# Patient Record
Sex: FEMALE | Race: WHITE | Employment: FULL TIME | URBAN - METROPOLITAN AREA
[De-identification: names, ages, dates, MRNs, and addresses within clinical notes are randomized per-mention and may not be internally consistent; named-entity substitution may affect disease eponyms.]

---

## 2017-08-04 ENCOUNTER — OFFICE VISIT (OUTPATIENT)
Dept: FAMILY MEDICINE CLINIC | Age: 37
End: 2017-08-04

## 2017-08-04 VITALS
BODY MASS INDEX: 41.94 KG/M2 | HEART RATE: 87 BPM | SYSTOLIC BLOOD PRESSURE: 120 MMHG | DIASTOLIC BLOOD PRESSURE: 70 MMHG | RESPIRATION RATE: 16 BRPM | WEIGHT: 284 LBS | OXYGEN SATURATION: 99 %

## 2017-08-04 DIAGNOSIS — N89.8 CYST, VAGINA: Primary | ICD-10-CM

## 2017-08-04 PROCEDURE — 99213 OFFICE O/P EST LOW 20 MIN: CPT | Performed by: NURSE PRACTITIONER

## 2017-08-04 RX ORDER — SULFAMETHOXAZOLE AND TRIMETHOPRIM 800; 160 MG/1; MG/1
2 TABLET ORAL 2 TIMES DAILY
Qty: 40 TABLET | Refills: 0 | Status: SHIPPED | OUTPATIENT
Start: 2017-08-04 | End: 2017-08-14

## 2018-01-09 ENCOUNTER — TELEPHONE (OUTPATIENT)
Dept: FAMILY MEDICINE CLINIC | Age: 38
End: 2018-01-09

## 2018-01-09 DIAGNOSIS — E11.9 TYPE 2 DIABETES MELLITUS WITHOUT COMPLICATION, WITHOUT LONG-TERM CURRENT USE OF INSULIN (HCC): Primary | ICD-10-CM

## 2018-01-09 NOTE — TELEPHONE ENCOUNTER
Attempted to contact patient on 1/9/2018. Result: left message on the patient's voicemail asking patient to return my call. Pre-Visit planning not completed.

## 2018-01-11 RX ORDER — DROSPIRENONE AND ETHINYL ESTRADIOL 0.02-3(28)
1 KIT ORAL DAILY
COMMUNITY
End: 2018-11-12

## 2018-01-11 NOTE — TELEPHONE ENCOUNTER
Attempted to contact patient on 1/11/2018. Result: left message on the patient's voicemail asking patient to return my call. Pre-Visit planning not completed.

## 2018-01-11 NOTE — TELEPHONE ENCOUNTER
Dr. Toni Coronel: This patient has an upcoming appointment with you for Diabetes. In planning for that visit I have completed the following pre-visit planning:     Pre-Visit Planning Checklist:  Patient contacted: yes  Verified patient by name and date of birth: yes    Health Maintenance items reviewed:    Pneumonia Vaccination:  patient not up to date; declined health this health maintenance recommendation     Labs and procedures pended: Fasting Hemoglobin A1C , Urine Microalbumin , CMP, Fasting Lipid  Labs and procedures discussed with patient: yes  Reminded patient to check with their insurance company about coverage for lab tests and lab location: no    Preliminary Medication Reconciliation: was performed. Reminded patient to bring medications to appointment: no    Reminded patient to arrive early: yes    Other notes: Patient is not able to have labs done prior to her appointment, she verbalized understanding of fasting labs & plans to have them done at the office visit tomorrow. Please complete the med-reconciliation and sign the appropriate labs as soon as possible.       Zeny Burton MA  Pre-Services Specialist

## 2018-01-12 ENCOUNTER — OFFICE VISIT (OUTPATIENT)
Dept: FAMILY MEDICINE CLINIC | Age: 38
End: 2018-01-12

## 2018-01-12 VITALS
BODY MASS INDEX: 41.65 KG/M2 | OXYGEN SATURATION: 98 % | DIASTOLIC BLOOD PRESSURE: 98 MMHG | SYSTOLIC BLOOD PRESSURE: 144 MMHG | TEMPERATURE: 97.6 F | HEART RATE: 111 BPM | HEIGHT: 69 IN | WEIGHT: 281.2 LBS

## 2018-01-12 DIAGNOSIS — M77.8 TENDINITIS OF RIGHT SHOULDER: ICD-10-CM

## 2018-01-12 DIAGNOSIS — E11.9 TYPE 2 DIABETES MELLITUS WITHOUT COMPLICATION, WITHOUT LONG-TERM CURRENT USE OF INSULIN (HCC): Primary | ICD-10-CM

## 2018-01-12 DIAGNOSIS — Z23 NEED FOR INFLUENZA VACCINATION: ICD-10-CM

## 2018-01-12 LAB
A/G RATIO: 1.3 (ref 1.1–2.2)
ALBUMIN SERPL-MCNC: 3.8 G/DL (ref 3.4–5)
ALP BLD-CCNC: 70 U/L (ref 40–129)
ALT SERPL-CCNC: 45 U/L (ref 10–40)
ANION GAP SERPL CALCULATED.3IONS-SCNC: 19 MMOL/L (ref 3–16)
AST SERPL-CCNC: 26 U/L (ref 15–37)
BILIRUB SERPL-MCNC: 0.3 MG/DL (ref 0–1)
BUN BLDV-MCNC: 13 MG/DL (ref 7–20)
CALCIUM SERPL-MCNC: 9 MG/DL (ref 8.3–10.6)
CHLORIDE BLD-SCNC: 99 MMOL/L (ref 99–110)
CHOLESTEROL, TOTAL: 168 MG/DL (ref 0–199)
CO2: 23 MMOL/L (ref 21–32)
CREAT SERPL-MCNC: <0.5 MG/DL (ref 0.6–1.1)
GFR AFRICAN AMERICAN: >60
GFR NON-AFRICAN AMERICAN: >60
GLOBULIN: 2.9 G/DL
GLUCOSE BLD-MCNC: 234 MG/DL (ref 70–99)
HDLC SERPL-MCNC: 44 MG/DL (ref 40–60)
LDL CHOLESTEROL CALCULATED: 82 MG/DL
POTASSIUM SERPL-SCNC: 4.2 MMOL/L (ref 3.5–5.1)
SODIUM BLD-SCNC: 141 MMOL/L (ref 136–145)
TOTAL PROTEIN: 6.7 G/DL (ref 6.4–8.2)
TRIGL SERPL-MCNC: 211 MG/DL (ref 0–150)
VLDLC SERPL CALC-MCNC: 42 MG/DL

## 2018-01-12 PROCEDURE — 90630 INFLUENZA, QUADV, 18-64 YRS, ID, PF, MICRO INJ, 0.1ML (FLUZONE QUADV, PF): CPT | Performed by: FAMILY MEDICINE

## 2018-01-12 PROCEDURE — 90471 IMMUNIZATION ADMIN: CPT | Performed by: FAMILY MEDICINE

## 2018-01-12 PROCEDURE — 36415 COLL VENOUS BLD VENIPUNCTURE: CPT | Performed by: FAMILY MEDICINE

## 2018-01-12 PROCEDURE — 99214 OFFICE O/P EST MOD 30 MIN: CPT | Performed by: FAMILY MEDICINE

## 2018-01-12 RX ORDER — MELOXICAM 15 MG/1
15 TABLET ORAL DAILY
Qty: 30 TABLET | Refills: 3 | Status: ON HOLD | OUTPATIENT
Start: 2018-01-12 | End: 2018-12-19 | Stop reason: HOSPADM

## 2018-01-13 LAB
ESTIMATED AVERAGE GLUCOSE: 231.7 MG/DL
HBA1C MFR BLD: 9.7 %

## 2018-01-21 NOTE — PROGRESS NOTES
Subjective:      Patient ID: Soha Artis is a 40 y.o. female. Soha Artis is a 40 y.o. female. Patient presents with:  Shoulder Pain: left   Diabetes        The patients PMH, surgical history, family history, medications, allergies were all reviewed and updated as appropriate today. Shoulder Pain    The pain is present in the right shoulder. This is a new problem. The current episode started 1 to 4 weeks ago. The problem occurs daily. The problem has been gradually worsening. The quality of the pain is described as aching. The pain is moderate. Pertinent negatives include no fever, inability to bear weight or itching. The symptoms are aggravated by activity. The treatment provided significant relief. Her past medical history is significant for diabetes. Diabetes   She presents for her follow-up diabetic visit. She has type 2 diabetes mellitus. Her disease course has been stable. There are no hypoglycemic associated symptoms. There are no diabetic associated symptoms. There are no hypoglycemic complications. There are no diabetic complications. There are no known risk factors for coronary artery disease. When asked about current treatments, none were reported. She is compliant with treatment all of the time. Her weight is stable. She is following a diabetic diet. When asked about meal planning, she reported none. She rarely participates in exercise. Her home blood glucose trend is fluctuating dramatically. An ACE inhibitor/angiotensin II receptor blocker is being taken. She does not see a podiatrist.Eye exam is current. Review of Systems   Constitutional: Negative for fever. Skin: Negative for itching. Objective:   Physical Exam   Constitutional: She is oriented to person, place, and time. She appears well-developed and well-nourished. HENT:   Head: Normocephalic and atraumatic.    Right Ear: External ear normal.   Left Ear: External ear normal.   Nose: Nose normal.

## 2018-09-04 ENCOUNTER — TELEPHONE (OUTPATIENT)
Dept: FAMILY MEDICINE CLINIC | Age: 38
End: 2018-09-04

## 2018-09-06 ENCOUNTER — TELEPHONE (OUTPATIENT)
Dept: FAMILY MEDICINE CLINIC | Age: 38
End: 2018-09-06

## 2018-09-10 ENCOUNTER — OFFICE VISIT (OUTPATIENT)
Dept: FAMILY MEDICINE CLINIC | Age: 38
End: 2018-09-10

## 2018-09-10 VITALS
SYSTOLIC BLOOD PRESSURE: 130 MMHG | OXYGEN SATURATION: 98 % | DIASTOLIC BLOOD PRESSURE: 80 MMHG | WEIGHT: 271 LBS | BODY MASS INDEX: 40.02 KG/M2 | HEART RATE: 104 BPM

## 2018-09-10 DIAGNOSIS — R56.9 NEW ONSET SEIZURE (HCC): ICD-10-CM

## 2018-09-10 DIAGNOSIS — E11.9 TYPE 2 DIABETES MELLITUS WITHOUT COMPLICATION, WITHOUT LONG-TERM CURRENT USE OF INSULIN (HCC): Primary | ICD-10-CM

## 2018-09-10 PROCEDURE — 99214 OFFICE O/P EST MOD 30 MIN: CPT | Performed by: FAMILY MEDICINE

## 2018-09-10 RX ORDER — LANCETS 33 GAUGE
EACH MISCELLANEOUS
COMMUNITY
Start: 2018-09-03 | End: 2018-09-10 | Stop reason: SDUPTHER

## 2018-09-10 RX ORDER — BLOOD-GLUCOSE METER
KIT MISCELLANEOUS
COMMUNITY
Start: 2018-09-03

## 2018-09-10 RX ORDER — LEVETIRACETAM 500 MG/1
500 TABLET ORAL 2 TIMES DAILY
COMMUNITY
Start: 2018-09-05 | End: 2019-11-13

## 2018-09-10 RX ORDER — LANCETS 33 GAUGE
EACH MISCELLANEOUS
Qty: 100 EACH | Refills: 1 | Status: SHIPPED | OUTPATIENT
Start: 2018-09-10

## 2018-09-10 ASSESSMENT — PATIENT HEALTH QUESTIONNAIRE - PHQ9
SUM OF ALL RESPONSES TO PHQ QUESTIONS 1-9: 0
SUM OF ALL RESPONSES TO PHQ QUESTIONS 1-9: 0
1. LITTLE INTEREST OR PLEASURE IN DOING THINGS: 0
2. FEELING DOWN, DEPRESSED OR HOPELESS: 0
SUM OF ALL RESPONSES TO PHQ9 QUESTIONS 1 & 2: 0

## 2018-09-12 ENCOUNTER — TELEPHONE (OUTPATIENT)
Dept: FAMILY MEDICINE CLINIC | Age: 38
End: 2018-09-12

## 2018-09-16 PROBLEM — R56.9 NEW ONSET SEIZURE (HCC): Status: ACTIVE | Noted: 2018-09-16

## 2018-09-16 NOTE — PROGRESS NOTES
9/10/2018     Miguel A Pepe (:  1980) is a 41 y.o. female, here for evaluation of the following medical concerns:    Carter Fragoso is a 41 y.o. female. Patient presents with: Follow-Up from Hospital: [facility] - Seizure       42 yo female with diabetes who recently was admitted to hospital for new onset seizures. Was not compliant to her diabetes medications and her A1C showed that her diabetes was not controlled, but she was not in DKA or hyperosmolar. Patient was started on Keppra and discharged with Neurology f/u. Patient has had no new seizure activity since. Denies any issues with neurologic deficits at this time. Patient notes no weakness or sensonry changes. No change in mental status. The patients PMH, surgical history, family history, medications, allergies were all reviewed and updated as appropriate today. Review of Systems    Prior to Visit Medications    Medication Sig Taking?  Authorizing Provider   levETIRAcetam (KEPPRA) 500 MG tablet Take 500 mg by mouth Yes Historical Provider, MD   Blood Glucose Monitoring Suppl (ONE TOUCH ULTRA MINI) w/Device KIT  Yes Historical Provider, MD Webber Flako LANCETS 55S MISC Use to test daily Yes Mendy Jones MD   ONE TOUCH ULTRA TEST strip Use to test daily Yes Mendy Jones MD   metFORMIN (GLUCOPHAGE) 1000 MG tablet TAKE 1 TABLET BY MOUTH TWICE DAILY WITH MEALS Yes Mendy Jones MD   meloxicam (MOBIC) 15 MG tablet Take 1 tablet by mouth daily Yes Mendy Jones MD   drospirenone-ethinyl estradiol (DIEGO) 3-0.02 MG per tablet Take 1 tablet by mouth daily Yes Historical Provider, MD   Liraglutide (VICTOZA) 18 MG/3ML SOPN SC injection 1.8  daily  RADHA Morales - INES        Social History   Substance Use Topics    Smoking status: Never Smoker    Smokeless tobacco: Never Used    Alcohol use No        Vitals:    09/10/18 1418 09/10/18 1439   BP: (!) 150/80 130/80   Pulse: 104    SpO2: 98%    Weight: 271 lb (122.9

## 2018-10-29 ENCOUNTER — TELEPHONE (OUTPATIENT)
Dept: FAMILY MEDICINE CLINIC | Age: 38
End: 2018-10-29

## 2018-11-08 ENCOUNTER — TELEPHONE (OUTPATIENT)
Dept: FAMILY MEDICINE CLINIC | Age: 38
End: 2018-11-08

## 2018-11-08 DIAGNOSIS — E11.9 TYPE 2 DIABETES MELLITUS WITHOUT COMPLICATION, WITHOUT LONG-TERM CURRENT USE OF INSULIN (HCC): Primary | ICD-10-CM

## 2018-11-09 RX ORDER — RIVAROXABAN 20 MG/1
TABLET, FILM COATED ORAL
Refills: 3 | COMMUNITY
Start: 2018-10-28 | End: 2019-04-24 | Stop reason: ALTCHOICE

## 2018-11-09 NOTE — TELEPHONE ENCOUNTER
Dr. Fer Parekh:    Notes: This patient has an upcoming appointment with you for Diabetes. In planning for that visit I have completed the following pre-visit planning:     Pre-Visit Planning Checklist:  Patient contacted: yes  Verified patient by name and date of birth: yes    Health Maintenance items reviewed:    No pre-visit planning health maintenance topics to review at this time    Labs and procedures pended: Non-Fasting Hemoglobin A1C  and Urine Microalbumin   Labs and procedures discussed with patient: yes  Reminded patient to check with their insurance company about coverage for lab tests and lab location: no    Preliminary Medication Reconciliation: was performed. Reminded patient to arrive early: yes    Please complete the med-reconciliation and sign the appropriate labs as soon as possible.       Juanita Bowman MA  Pre-Services Specialist

## 2018-11-12 ENCOUNTER — OFFICE VISIT (OUTPATIENT)
Dept: FAMILY MEDICINE CLINIC | Age: 38
End: 2018-11-12
Payer: COMMERCIAL

## 2018-11-12 VITALS
HEART RATE: 97 BPM | BODY MASS INDEX: 41.35 KG/M2 | DIASTOLIC BLOOD PRESSURE: 96 MMHG | WEIGHT: 280 LBS | OXYGEN SATURATION: 97 % | SYSTOLIC BLOOD PRESSURE: 132 MMHG

## 2018-11-12 DIAGNOSIS — N92.6 MENSTRUAL IRREGULARITY: Primary | ICD-10-CM

## 2018-11-12 DIAGNOSIS — E11.9 TYPE 2 DIABETES MELLITUS WITHOUT COMPLICATION, WITHOUT LONG-TERM CURRENT USE OF INSULIN (HCC): ICD-10-CM

## 2018-11-12 LAB
A/G RATIO: 1.3 (ref 1.1–2.2)
ALBUMIN SERPL-MCNC: 4 G/DL (ref 3.4–5)
ALP BLD-CCNC: 89 U/L (ref 40–129)
ALT SERPL-CCNC: 23 U/L (ref 10–40)
ANION GAP SERPL CALCULATED.3IONS-SCNC: 17 MMOL/L (ref 3–16)
AST SERPL-CCNC: 14 U/L (ref 15–37)
BILIRUB SERPL-MCNC: <0.2 MG/DL (ref 0–1)
BUN BLDV-MCNC: 14 MG/DL (ref 7–20)
CALCIUM SERPL-MCNC: 9.1 MG/DL (ref 8.3–10.6)
CHLORIDE BLD-SCNC: 103 MMOL/L (ref 99–110)
CHOLESTEROL, TOTAL: 150 MG/DL (ref 0–199)
CO2: 20 MMOL/L (ref 21–32)
CREAT SERPL-MCNC: 0.6 MG/DL (ref 0.6–1.1)
CREATININE URINE: 248 MG/DL (ref 28–259)
GFR AFRICAN AMERICAN: >60
GFR NON-AFRICAN AMERICAN: >60
GLOBULIN: 3 G/DL
GLUCOSE BLD-MCNC: 257 MG/DL (ref 70–99)
HBA1C MFR BLD: 9 %
HDLC SERPL-MCNC: 48 MG/DL (ref 40–60)
LDL CHOLESTEROL CALCULATED: 73 MG/DL
MICROALBUMIN UR-MCNC: 1.5 MG/DL
MICROALBUMIN/CREAT UR-RTO: 6 MG/G (ref 0–30)
POTASSIUM SERPL-SCNC: 4.2 MMOL/L (ref 3.5–5.1)
SODIUM BLD-SCNC: 140 MMOL/L (ref 136–145)
TOTAL PROTEIN: 7 G/DL (ref 6.4–8.2)
TRIGL SERPL-MCNC: 147 MG/DL (ref 0–150)
TSH REFLEX: 3.57 UIU/ML (ref 0.27–4.2)
VLDLC SERPL CALC-MCNC: 29 MG/DL

## 2018-11-12 PROCEDURE — 99214 OFFICE O/P EST MOD 30 MIN: CPT | Performed by: FAMILY MEDICINE

## 2018-11-12 PROCEDURE — 83036 HEMOGLOBIN GLYCOSYLATED A1C: CPT | Performed by: FAMILY MEDICINE

## 2018-11-12 RX ORDER — SPIRONOLACTONE 25 MG/1
25 TABLET ORAL DAILY
Qty: 30 TABLET | Refills: 1 | Status: SHIPPED | OUTPATIENT
Start: 2018-11-12 | End: 2019-01-16 | Stop reason: SDUPTHER

## 2018-11-16 ENCOUNTER — OFFICE VISIT (OUTPATIENT)
Dept: OBGYN CLINIC | Age: 38
End: 2018-11-16
Payer: COMMERCIAL

## 2018-11-16 VITALS
WEIGHT: 276.2 LBS | SYSTOLIC BLOOD PRESSURE: 126 MMHG | TEMPERATURE: 97.8 F | HEIGHT: 68 IN | BODY MASS INDEX: 41.86 KG/M2 | DIASTOLIC BLOOD PRESSURE: 78 MMHG | HEART RATE: 97 BPM

## 2018-11-16 DIAGNOSIS — D68.51 HETEROZYGOUS FACTOR V LEIDEN MUTATION (HCC): ICD-10-CM

## 2018-11-16 DIAGNOSIS — Z86.718 HX OF CEREBRAL VENOUS SINUS THROMBOSIS: Primary | ICD-10-CM

## 2018-11-16 DIAGNOSIS — N93.9 ABNORMAL UTERINE BLEEDING (AUB): Primary | ICD-10-CM

## 2018-11-16 DIAGNOSIS — N93.9 ABNORMAL UTERINE BLEEDING: ICD-10-CM

## 2018-11-16 DIAGNOSIS — E66.01 CLASS 3 SEVERE OBESITY DUE TO EXCESS CALORIES WITH BODY MASS INDEX (BMI) OF 40.0 TO 44.9 IN ADULT, UNSPECIFIED WHETHER SERIOUS COMORBIDITY PRESENT (HCC): ICD-10-CM

## 2018-11-16 DIAGNOSIS — Z30.013 ENCOUNTER FOR INITIAL PRESCRIPTION OF INJECTABLE CONTRACEPTIVE: ICD-10-CM

## 2018-11-16 DIAGNOSIS — E11.65 TYPE 2 DIABETES MELLITUS WITH HYPERGLYCEMIA, WITHOUT LONG-TERM CURRENT USE OF INSULIN (HCC): ICD-10-CM

## 2018-11-16 DIAGNOSIS — L68.0 FEMALE HIRSUTISM: ICD-10-CM

## 2018-11-16 PROBLEM — E66.813 CLASS 3 SEVERE OBESITY DUE TO EXCESS CALORIES WITH BODY MASS INDEX (BMI) OF 40.0 TO 44.9 IN ADULT: Status: ACTIVE | Noted: 2018-11-16

## 2018-11-16 LAB
CONTROL: NORMAL
PREGNANCY TEST URINE, POC: NEGATIVE

## 2018-11-16 PROCEDURE — 81025 URINE PREGNANCY TEST: CPT | Performed by: OBSTETRICS & GYNECOLOGY

## 2018-11-16 PROCEDURE — 76856 US EXAM PELVIC COMPLETE: CPT | Performed by: OBSTETRICS & GYNECOLOGY

## 2018-11-16 PROCEDURE — 99204 OFFICE O/P NEW MOD 45 MIN: CPT | Performed by: OBSTETRICS & GYNECOLOGY

## 2018-11-16 ASSESSMENT — ENCOUNTER SYMPTOMS
ROS SKIN COMMENTS: ABNORMAL HAIR GROWTH
NAUSEA: 0
CHEST TIGHTNESS: 0
SHORTNESS OF BREATH: 0
COLOR CHANGE: 0
DIARRHEA: 0
SORE THROAT: 0
VOMITING: 0
WHEEZING: 0
BACK PAIN: 0
ABDOMINAL PAIN: 0

## 2018-11-27 ENCOUNTER — TELEPHONE (OUTPATIENT)
Dept: FAMILY MEDICINE CLINIC | Age: 38
End: 2018-11-27

## 2018-11-30 ENCOUNTER — TELEPHONE (OUTPATIENT)
Dept: OBGYN CLINIC | Age: 38
End: 2018-11-30

## 2018-11-30 RX ORDER — MEDROXYPROGESTERONE ACETATE 150 MG/ML
150 INJECTION, SUSPENSION INTRAMUSCULAR
Qty: 1 ML | Refills: 0 | Status: SHIPPED | OUTPATIENT
Start: 2018-11-30 | End: 2018-12-03

## 2018-12-03 ENCOUNTER — OFFICE VISIT (OUTPATIENT)
Dept: OBGYN CLINIC | Age: 38
End: 2018-12-03
Payer: COMMERCIAL

## 2018-12-03 VITALS
HEIGHT: 68 IN | HEART RATE: 87 BPM | BODY MASS INDEX: 42.77 KG/M2 | WEIGHT: 282.2 LBS | TEMPERATURE: 98.2 F | SYSTOLIC BLOOD PRESSURE: 146 MMHG | DIASTOLIC BLOOD PRESSURE: 92 MMHG

## 2018-12-03 DIAGNOSIS — N93.9 ABNORMAL UTERINE BLEEDING: ICD-10-CM

## 2018-12-03 DIAGNOSIS — E11.9 TYPE 2 DIABETES MELLITUS WITHOUT COMPLICATION, WITHOUT LONG-TERM CURRENT USE OF INSULIN (HCC): ICD-10-CM

## 2018-12-03 DIAGNOSIS — Z32.02 NEGATIVE PREGNANCY TEST: ICD-10-CM

## 2018-12-03 DIAGNOSIS — Z30.013 ENCOUNTER FOR INITIAL PRESCRIPTION OF INJECTABLE CONTRACEPTIVE: ICD-10-CM

## 2018-12-03 DIAGNOSIS — L68.0 FEMALE HIRSUTISM: ICD-10-CM

## 2018-12-03 DIAGNOSIS — D68.51 HETEROZYGOUS FACTOR V LEIDEN MUTATION (HCC): ICD-10-CM

## 2018-12-03 DIAGNOSIS — E66.01 CLASS 3 SEVERE OBESITY DUE TO EXCESS CALORIES WITH BODY MASS INDEX (BMI) OF 40.0 TO 44.9 IN ADULT, UNSPECIFIED WHETHER SERIOUS COMORBIDITY PRESENT (HCC): ICD-10-CM

## 2018-12-03 DIAGNOSIS — Z86.718 HX OF CEREBRAL VENOUS SINUS THROMBOSIS: ICD-10-CM

## 2018-12-03 DIAGNOSIS — Z01.419 WOMEN'S ANNUAL ROUTINE GYNECOLOGICAL EXAMINATION: Primary | ICD-10-CM

## 2018-12-03 LAB
CONTROL: NORMAL
PREGNANCY TEST URINE, POC: NEGATIVE

## 2018-12-03 PROCEDURE — G0101 CA SCREEN;PELVIC/BREAST EXAM: HCPCS | Performed by: OBSTETRICS & GYNECOLOGY

## 2018-12-03 PROCEDURE — 81025 URINE PREGNANCY TEST: CPT | Performed by: OBSTETRICS & GYNECOLOGY

## 2018-12-03 RX ORDER — MEDROXYPROGESTERONE ACETATE 150 MG/ML
150 INJECTION, SUSPENSION INTRAMUSCULAR ONCE
Status: DISCONTINUED | OUTPATIENT
Start: 2018-12-03 | End: 2018-12-03

## 2018-12-03 RX ORDER — MEDROXYPROGESTERONE ACETATE 150 MG/ML
150 INJECTION, SUSPENSION INTRAMUSCULAR
Qty: 1 ML | Refills: 0 | Status: SHIPPED | OUTPATIENT
Start: 2018-12-03 | End: 2019-04-24 | Stop reason: ALTCHOICE

## 2018-12-03 NOTE — PROGRESS NOTES
reviewed. No pertinent surgical history. Allergies: Allergies   Allergen Reactions    Codeine      Hives/Rash    Prochlorperazine Edisylate Other (See Comments)     Muscle spasms neck    Sulfa Antibiotics Other (See Comments)       Family History:  Family History   Problem Relation Age of Onset    High Blood Pressure Father     No Known Problems Mother     Cancer Maternal Uncle         lung    Alcohol Abuse Paternal Grandfather     No Known Problems Paternal Grandmother     No Known Problems Maternal Grandmother     No Known Problems Maternal Grandfather     Heart Failure Sister     No Known Problems Paternal Aunt     No Known Problems Maternal Uncle     No Known Problems Maternal Uncle     COPD Maternal Aunt     Other Paternal Aunt         suicide       Social History:  Social History     Social History    Marital status: Single     Spouse name: N/A    Number of children: N/A    Years of education: N/A     Social History Main Topics    Smoking status: Never Smoker    Smokeless tobacco: Never Used    Alcohol use Yes      Comment: social    Drug use: No    Sexual activity: Yes     Partners: Male     Other Topics Concern    None     Social History Narrative    None       Objective: Body mass index is 43.23 kg/m². BP (!) 146/92 (Site: Right Upper Arm, Position: Sitting, Cuff Size: Medium Adult)   Pulse 87   Temp 98.2 °F (36.8 °C) (Oral)   Ht 5' 7.75\" (1.721 m)   Wt 282 lb 3.2 oz (128 kg)   LMP 11/18/2018   Breastfeeding?  No   BMI 43.23 kg/m²     Exam:   General: Alert, well appearing, no acute distress  Head: Normocephalic, atraumatic  Mouth: Mucous membranes moist, pharynx normal without lesions  Thyroid: No thyromegaly or masses present   Breasts: Symmetric, non-tender to palpation, no skin changes, palpable axillary lymph nodes or masses noted  Lungs: Clear to auscultation bilaterally without rales, rhonchi, wheezing  CV: Regular rate and regular rhythm, normal S1, S2 without

## 2018-12-03 NOTE — LETTER
Cefazolin IVPB per weight base protocol  ? Cefazolin 2 grams if <119.9kg  ?  Cefazolin 3 grams if ?120kg (?264 lbs.)      Ampicillin/Sulbactam 3g IVPB       ALTERNATIVE:    Levofloxacin 500 mg IVPB     ALTERNATIVE: (Consider for PCN/Cephalosporin allergic pts)        Clindamycin 900 mg IVPB + Gentamicin 1.5 mg/kg IVPB x 1    Metronidazole 500 mg IVPB x1 and Gentamicin 1.5 mg/kg IVPB x1   ADDITIONAL MEDICATIONS:    DVT PREVENTION:       ? Knee Antithrombic compression wraps    Additional Orders: _____________________________________________________________________ ________________________________________________________________________________                Signature _____________________________________________Date _____________________    Please fax at time of booking the case to the Scheduling office at [address]  [facility] at [phone]                                             Updated: 3/2015

## 2018-12-04 LAB
FOLLICLE STIMULATING HORMONE: 6.3 MIU/ML
LUTEINIZING HORMONE: 10.3 MIU/ML
PROLACTIN: 16.7 NG/ML
SEX HORMONE BINDING GLOBULIN: 52 NMOL/L (ref 30–135)

## 2018-12-05 LAB
SEX HORMONE BINDING GLOBULIN: 52 NMOL/L (ref 30–135)
TESTOSTERONE FREE-NONMALE: 4.3 PG/ML (ref 1.3–9.2)
TESTOSTERONE TOTAL: 32 NG/DL (ref 20–70)

## 2018-12-06 LAB — 17-OH PROGESTERONE LCMS: 85.05 NG/DL

## 2018-12-07 ENCOUNTER — OFFICE VISIT (OUTPATIENT)
Dept: FAMILY MEDICINE CLINIC | Age: 38
End: 2018-12-07
Payer: COMMERCIAL

## 2018-12-07 VITALS
OXYGEN SATURATION: 99 % | HEART RATE: 102 BPM | SYSTOLIC BLOOD PRESSURE: 122 MMHG | RESPIRATION RATE: 16 BRPM | WEIGHT: 279 LBS | DIASTOLIC BLOOD PRESSURE: 76 MMHG | HEIGHT: 68 IN | BODY MASS INDEX: 42.28 KG/M2

## 2018-12-07 DIAGNOSIS — N92.6 IRREGULAR PERIODS/MENSTRUAL CYCLES: ICD-10-CM

## 2018-12-07 DIAGNOSIS — Z01.818 PRE-OP EXAM: Primary | ICD-10-CM

## 2018-12-07 LAB
ANION GAP SERPL CALCULATED.3IONS-SCNC: 18 MMOL/L (ref 3–16)
BASOPHILS ABSOLUTE: 0.1 K/UL (ref 0–0.2)
BASOPHILS RELATIVE PERCENT: 0.6 %
BUN BLDV-MCNC: 14 MG/DL (ref 7–20)
CALCIUM SERPL-MCNC: 9.3 MG/DL (ref 8.3–10.6)
CHLORIDE BLD-SCNC: 95 MMOL/L (ref 99–110)
CO2: 20 MMOL/L (ref 21–32)
CREAT SERPL-MCNC: 0.6 MG/DL (ref 0.6–1.1)
EOSINOPHILS ABSOLUTE: 0.2 K/UL (ref 0–0.6)
EOSINOPHILS RELATIVE PERCENT: 2.1 %
GFR AFRICAN AMERICAN: >60
GFR NON-AFRICAN AMERICAN: >60
GLUCOSE BLD-MCNC: 307 MG/DL (ref 70–99)
HCT VFR BLD CALC: 37.5 % (ref 36–48)
HEMOGLOBIN: 11.6 G/DL (ref 12–16)
LYMPHOCYTES ABSOLUTE: 2.6 K/UL (ref 1–5.1)
LYMPHOCYTES RELATIVE PERCENT: 23.4 %
MCH RBC QN AUTO: 24.2 PG (ref 26–34)
MCHC RBC AUTO-ENTMCNC: 31 G/DL (ref 31–36)
MCV RBC AUTO: 78 FL (ref 80–100)
MONOCYTES ABSOLUTE: 0.6 K/UL (ref 0–1.3)
MONOCYTES RELATIVE PERCENT: 5.6 %
NEUTROPHILS ABSOLUTE: 7.6 K/UL (ref 1.7–7.7)
NEUTROPHILS RELATIVE PERCENT: 68.3 %
PDW BLD-RTO: 15.7 % (ref 12.4–15.4)
PLATELET # BLD: 511 K/UL (ref 135–450)
PMV BLD AUTO: 7.8 FL (ref 5–10.5)
POTASSIUM SERPL-SCNC: 4.4 MMOL/L (ref 3.5–5.1)
RBC # BLD: 4.81 M/UL (ref 4–5.2)
SODIUM BLD-SCNC: 133 MMOL/L (ref 136–145)
WBC # BLD: 11.1 K/UL (ref 4–11)

## 2018-12-07 PROCEDURE — 99242 OFF/OP CONSLTJ NEW/EST SF 20: CPT | Performed by: NURSE PRACTITIONER

## 2018-12-07 PROCEDURE — 36415 COLL VENOUS BLD VENIPUNCTURE: CPT | Performed by: NURSE PRACTITIONER

## 2018-12-07 NOTE — PROGRESS NOTES
[facility]  [phone]  Fax: [phone]   Pre-operative History and Physical    Jhonny Hernandez is a 41 y.o. female who is referred by Dr. Tomi Essex for a preoperative physical examination and medical clearance for surgery. She is scheduled to have Uterus cyst removal at [facility] on 12-19-18. DIAGNOSIS:  irregular periods and had an abnormal ultrasound of uterus        History Obtained From:  patient    HISTORY OF PRESENT ILLNESS:    The patient is a 41 y.o. female with significant past medical history of irregular bleeding who presents for Pre-op exam       Past Medical History:   Diagnosis Date    Class 3 severe obesity due to excess calories with body mass index (BMI) of 40.0 to 44.9 in adult [facility]) 11/16/2018    New onset seizure ([facility] [address] 9/16/2018    Type 2 diabetes mellitus without complication ([facility] [address] 2/73/9389     No past surgical history on file. Current Outpatient Prescriptions   Medication Sig Dispense Refill    medroxyPROGESTERone (DEPO-PROVERA) 150 MG/ML injection Inject 1 mL into the muscle every 3 months 1 mL 0    spironolactone (ALDACTONE) 25 MG tablet Take 1 tablet by mouth daily 30 tablet 1    XARELTO 20 MG TABS tablet TK 1 T PO  Daily  3    levETIRAcetam (KEPPRA) 500 MG tablet Take 500 mg by mouth      Blood Glucose Monitoring Suppl (ONE TOUCH ULTRA MINI) w/Device KIT       ONETOUCH DELICA LANCETS 22B MISC Use to test daily 100 each 1    ONE TOUCH ULTRA TEST strip Use to test daily 100 each 1    metFORMIN (GLUCOPHAGE) 1000 MG tablet TAKE 1 TABLET BY MOUTH TWICE DAILY WITH MEALS 60 tablet 3    meloxicam (MOBIC) 15 MG tablet Take 1 tablet by mouth daily 30 tablet 3    Liraglutide (VICTOZA) 18 MG/3ML SOPN SC injection 1.8  daily 2 Pen 3     No current facility-administered medications for this visit.           Allergies:  Codeine; Prochlorperazine edisylate; and Sulfa antibiotics  History of allergic reaction to anesthesia:  No     History   Smoking Status    Never

## 2018-12-10 ENCOUNTER — OFFICE VISIT (OUTPATIENT)
Dept: OBGYN CLINIC | Age: 38
End: 2018-12-10

## 2018-12-10 VITALS
DIASTOLIC BLOOD PRESSURE: 74 MMHG | WEIGHT: 280.4 LBS | SYSTOLIC BLOOD PRESSURE: 122 MMHG | TEMPERATURE: 98 F | HEART RATE: 91 BPM | BODY MASS INDEX: 42.95 KG/M2

## 2018-12-10 DIAGNOSIS — N84.0 INTRAUTERINE POLYP: ICD-10-CM

## 2018-12-10 DIAGNOSIS — E66.01 CLASS 3 SEVERE OBESITY DUE TO EXCESS CALORIES WITHOUT SERIOUS COMORBIDITY WITH BODY MASS INDEX (BMI) OF 40.0 TO 44.9 IN ADULT (HCC): ICD-10-CM

## 2018-12-10 DIAGNOSIS — E11.9 TYPE 2 DIABETES MELLITUS WITHOUT COMPLICATION, WITHOUT LONG-TERM CURRENT USE OF INSULIN (HCC): ICD-10-CM

## 2018-12-10 DIAGNOSIS — Z01.818 PRE-OP EVALUATION: Primary | ICD-10-CM

## 2018-12-10 DIAGNOSIS — D68.51 HETEROZYGOUS FACTOR V LEIDEN MUTATION (HCC): ICD-10-CM

## 2018-12-10 DIAGNOSIS — N93.9 ABNORMAL UTERINE BLEEDING (AUB): ICD-10-CM

## 2018-12-10 DIAGNOSIS — Z86.718 HX OF CEREBRAL VENOUS SINUS THROMBOSIS: ICD-10-CM

## 2018-12-10 DIAGNOSIS — G89.18 POST-OPERATIVE PAIN: ICD-10-CM

## 2018-12-10 PROCEDURE — 99024 POSTOP FOLLOW-UP VISIT: CPT | Performed by: OBSTETRICS & GYNECOLOGY

## 2018-12-10 RX ORDER — IBUPROFEN 200 MG
200 TABLET ORAL EVERY 6 HOURS PRN
COMMUNITY
End: 2018-12-10

## 2018-12-10 RX ORDER — IBUPROFEN 800 MG/1
800 TABLET ORAL EVERY 8 HOURS PRN
Qty: 30 TABLET | Refills: 1 | Status: SHIPPED | OUTPATIENT
Start: 2018-12-10 | End: 2021-02-08

## 2018-12-10 RX ORDER — HYDROCODONE BITARTRATE AND ACETAMINOPHEN 5; 325 MG/1; MG/1
1 TABLET ORAL EVERY 6 HOURS PRN
Qty: 12 TABLET | Refills: 0 | Status: SHIPPED | OUTPATIENT
Start: 2018-12-10 | End: 2018-12-13

## 2018-12-10 ASSESSMENT — ENCOUNTER SYMPTOMS
VOMITING: 0
CHEST TIGHTNESS: 0
SORE THROAT: 0
ROS SKIN COMMENTS: ABNORMAL HAIR GROWTH
COLOR CHANGE: 0
SHORTNESS OF BREATH: 0
BACK PAIN: 0
WHEEZING: 0
ABDOMINAL PAIN: 0
NAUSEA: 0
DIARRHEA: 0

## 2018-12-10 NOTE — PROGRESS NOTES
disturbance and suicidal ideas. Gynecologic History:   Denies history of abnormal pap smears   - Last PAP: 17 (negative)    - Previous PAP in records: 16 (negative), 14 (negative)   Denies history of of STIs    Menstrual history:    - see above     Obstetrical History:  OB History      Para Term  AB Living    0 0 0 0 0 0    SAB TAB Ectopic Molar Multiple Live Births    0 0 0 0 0 0          Past Medical History:   Past Medical History:   Diagnosis Date    Class 3 severe obesity due to excess calories with body mass index (BMI) of 40.0 to 44.9 in adult [facility]) 2018    New onset seizure ([facility] [address] 2018    Type 2 diabetes mellitus without complication ([facility]        Medications:  Current Outpatient Prescriptions   Medication Sig Dispense Refill    ibuprofen (ADVIL;MOTRIN) 800 MG tablet Take 1 tablet by mouth every 8 hours as needed for Pain 30 tablet 1    HYDROcodone-acetaminophen (NORCO) 5-325 MG per tablet Take 1 tablet by mouth every 6 hours as needed for Pain for up to 3 days. Intended supply: 3 days. Take lowest dose possible to manage pain. 12 tablet 0    medroxyPROGESTERone (DEPO-PROVERA) 150 MG/ML injection Inject 1 mL into the muscle every 3 months 1 mL 0    spironolactone (ALDACTONE) 25 MG tablet Take 1 tablet by mouth daily 30 tablet 1    XARELTO 20 MG TABS tablet TK 1 T PO  Daily  3    levETIRAcetam (KEPPRA) 500 MG tablet Take 500 mg by mouth      Blood Glucose Monitoring Suppl (ONE TOUCH ULTRA MINI) w/Device KIT       ONETOUCH DELICA LANCETS 76F MISC Use to test daily 100 each 1    ONE TOUCH ULTRA TEST strip Use to test daily 100 each 1    metFORMIN (GLUCOPHAGE) 1000 MG tablet TAKE 1 TABLET BY MOUTH TWICE DAILY WITH MEALS 60 tablet 3    meloxicam (MOBIC) 15 MG tablet Take 1 tablet by mouth daily 30 tablet 3     No current facility-administered medications for this visit.         Allergies:  Codeine; Prochlorperazine edisylate; and Sulfa

## 2018-12-12 NOTE — PROGRESS NOTES
Obstructive Sleep Apnea (JESSICA) Screening     Patient:  Rajni     YOB: 1980      Medical Record #:  [id number]                     Date:  12/12/2018     1. Are you a loud and/or regular snorer? []  Yes       [x] No    2. Have you been observed to gasp or stop breathing during sleep? []  Yes       [x] No    3. Do you feel tired or groggy upon awakening or do you awaken with a headache?           []  Yes       [] No    4. Are you often tired or fatigued during the wake time hours? []  Yes       [] No    5. Do you fall asleep sitting, reading, watching TV or driving? []  Yes       [] No    6. Do you often have problems with memory or concentration? []  Yes       [] No    **If patient's score is ? 3 they are considered high risk for JESSICA. Notify the anesthesiologist of the high risk and document in focus note. Note:  If the patient's BMI is more than 35 kg m¯² , has neck circumference > 40 cm, and/or high blood pressure the risk is greater (© American Sleep Apnea Association, 2006).

## 2018-12-19 ENCOUNTER — ANESTHESIA (OUTPATIENT)
Dept: OPERATING ROOM | Age: 38
End: 2018-12-19
Payer: COMMERCIAL

## 2018-12-19 ENCOUNTER — HOSPITAL ENCOUNTER (OUTPATIENT)
Age: 38
Setting detail: OUTPATIENT SURGERY
Discharge: HOME | End: 2018-12-19
Attending: OBSTETRICS & GYNECOLOGY | Admitting: OBSTETRICS & GYNECOLOGY
Payer: COMMERCIAL

## 2018-12-19 ENCOUNTER — ANESTHESIA EVENT (OUTPATIENT)
Dept: OPERATING ROOM | Age: 38
End: 2018-12-19
Payer: COMMERCIAL

## 2018-12-19 VITALS
RESPIRATION RATE: 6 BRPM | OXYGEN SATURATION: 99 % | TEMPERATURE: 98.6 F | DIASTOLIC BLOOD PRESSURE: 98 MMHG | SYSTOLIC BLOOD PRESSURE: 153 MMHG

## 2018-12-19 VITALS
OXYGEN SATURATION: 100 % | RESPIRATION RATE: 16 BRPM | HEART RATE: 75 BPM | SYSTOLIC BLOOD PRESSURE: 109 MMHG | DIASTOLIC BLOOD PRESSURE: 74 MMHG | BODY MASS INDEX: 41.47 KG/M2 | TEMPERATURE: 96.9 F | HEIGHT: 69 IN | WEIGHT: 280 LBS

## 2018-12-19 DIAGNOSIS — N84.0 UTERINE POLYP: ICD-10-CM

## 2018-12-19 DIAGNOSIS — R93.89 THICKENED ENDOMETRIUM: ICD-10-CM

## 2018-12-19 LAB
GLUCOSE BLD-MCNC: 267 MG/DL (ref 70–99)
PERFORMED ON: ABNORMAL
PREGNANCY, URINE: NEGATIVE

## 2018-12-19 PROCEDURE — 88305 TISSUE EXAM BY PATHOLOGIST: CPT

## 2018-12-19 PROCEDURE — 7100000001 HC PACU RECOVERY - ADDTL 15 MIN: Performed by: OBSTETRICS & GYNECOLOGY

## 2018-12-19 PROCEDURE — 2500000003 HC RX 250 WO HCPCS: Performed by: NURSE ANESTHETIST, CERTIFIED REGISTERED

## 2018-12-19 PROCEDURE — 2709999900 HC NON-CHARGEABLE SUPPLY: Performed by: OBSTETRICS & GYNECOLOGY

## 2018-12-19 PROCEDURE — 58558 HYSTEROSCOPY BIOPSY: CPT | Performed by: OBSTETRICS & GYNECOLOGY

## 2018-12-19 PROCEDURE — 2580000003 HC RX 258: Performed by: OBSTETRICS & GYNECOLOGY

## 2018-12-19 PROCEDURE — 3600000005 HC SURGERY LEVEL 5 BASE: Performed by: OBSTETRICS & GYNECOLOGY

## 2018-12-19 PROCEDURE — 7100000000 HC PACU RECOVERY - FIRST 15 MIN: Performed by: OBSTETRICS & GYNECOLOGY

## 2018-12-19 PROCEDURE — 7100000010 HC PHASE II RECOVERY - FIRST 15 MIN: Performed by: OBSTETRICS & GYNECOLOGY

## 2018-12-19 PROCEDURE — 2580000003 HC RX 258: Performed by: ANESTHESIOLOGY

## 2018-12-19 PROCEDURE — 2720000010 HC SURG SUPPLY STERILE: Performed by: OBSTETRICS & GYNECOLOGY

## 2018-12-19 PROCEDURE — 3700000001 HC ADD 15 MINUTES (ANESTHESIA): Performed by: OBSTETRICS & GYNECOLOGY

## 2018-12-19 PROCEDURE — 6360000002 HC RX W HCPCS: Performed by: NURSE ANESTHETIST, CERTIFIED REGISTERED

## 2018-12-19 PROCEDURE — 3700000000 HC ANESTHESIA ATTENDED CARE: Performed by: OBSTETRICS & GYNECOLOGY

## 2018-12-19 PROCEDURE — 3600000015 HC SURGERY LEVEL 5 ADDTL 15MIN: Performed by: OBSTETRICS & GYNECOLOGY

## 2018-12-19 PROCEDURE — 84703 CHORIONIC GONADOTROPIN ASSAY: CPT

## 2018-12-19 PROCEDURE — 7100000011 HC PHASE II RECOVERY - ADDTL 15 MIN: Performed by: OBSTETRICS & GYNECOLOGY

## 2018-12-19 RX ORDER — LIDOCAINE HYDROCHLORIDE 20 MG/ML
INJECTION, SOLUTION INFILTRATION; PERINEURAL PRN
Status: DISCONTINUED | OUTPATIENT
Start: 2018-12-19 | End: 2018-12-19 | Stop reason: SDUPTHER

## 2018-12-19 RX ORDER — OXYCODONE HYDROCHLORIDE AND ACETAMINOPHEN 5; 325 MG/1; MG/1
1 TABLET ORAL PRN
Status: DISCONTINUED | OUTPATIENT
Start: 2018-12-19 | End: 2018-12-19 | Stop reason: HOSPADM

## 2018-12-19 RX ORDER — ROCURONIUM BROMIDE 10 MG/ML
INJECTION, SOLUTION INTRAVENOUS PRN
Status: DISCONTINUED | OUTPATIENT
Start: 2018-12-19 | End: 2018-12-19 | Stop reason: SDUPTHER

## 2018-12-19 RX ORDER — SODIUM CHLORIDE 0.9 % (FLUSH) 0.9 %
SYRINGE (ML) INJECTION
Status: DISCONTINUED
Start: 2018-12-19 | End: 2018-12-19 | Stop reason: HOSPADM

## 2018-12-19 RX ORDER — DEXAMETHASONE SODIUM PHOSPHATE 4 MG/ML
INJECTION, SOLUTION INTRA-ARTICULAR; INTRALESIONAL; INTRAMUSCULAR; INTRAVENOUS; SOFT TISSUE PRN
Status: DISCONTINUED | OUTPATIENT
Start: 2018-12-19 | End: 2018-12-19 | Stop reason: SDUPTHER

## 2018-12-19 RX ORDER — SODIUM CHLORIDE, SODIUM LACTATE, POTASSIUM CHLORIDE, AND CALCIUM CHLORIDE .6; .31; .03; .02 G/100ML; G/100ML; G/100ML; G/100ML
IRRIGANT IRRIGATION PRN
Status: DISCONTINUED | OUTPATIENT
Start: 2018-12-19 | End: 2018-12-19 | Stop reason: HOSPADM

## 2018-12-19 RX ORDER — ONDANSETRON 2 MG/ML
INJECTION INTRAMUSCULAR; INTRAVENOUS PRN
Status: DISCONTINUED | OUTPATIENT
Start: 2018-12-19 | End: 2018-12-19 | Stop reason: SDUPTHER

## 2018-12-19 RX ORDER — SODIUM CHLORIDE, SODIUM LACTATE, POTASSIUM CHLORIDE, CALCIUM CHLORIDE 600; 310; 30; 20 MG/100ML; MG/100ML; MG/100ML; MG/100ML
INJECTION, SOLUTION INTRAVENOUS CONTINUOUS
Status: DISCONTINUED | OUTPATIENT
Start: 2018-12-19 | End: 2018-12-19 | Stop reason: HOSPADM

## 2018-12-19 RX ORDER — HYDRALAZINE HYDROCHLORIDE 20 MG/ML
5 INJECTION INTRAMUSCULAR; INTRAVENOUS EVERY 30 MIN PRN
Status: DISCONTINUED | OUTPATIENT
Start: 2018-12-19 | End: 2018-12-19 | Stop reason: HOSPADM

## 2018-12-19 RX ORDER — MIDAZOLAM HYDROCHLORIDE 1 MG/ML
INJECTION INTRAMUSCULAR; INTRAVENOUS PRN
Status: DISCONTINUED | OUTPATIENT
Start: 2018-12-19 | End: 2018-12-19 | Stop reason: SDUPTHER

## 2018-12-19 RX ORDER — DIAPER,BRIEF,INFANT-TODD,DISP
EACH MISCELLANEOUS PRN
Status: DISCONTINUED | OUTPATIENT
Start: 2018-12-19 | End: 2018-12-19 | Stop reason: HOSPADM

## 2018-12-19 RX ORDER — MEPERIDINE HYDROCHLORIDE 50 MG/ML
12.5 INJECTION INTRAMUSCULAR; INTRAVENOUS; SUBCUTANEOUS EVERY 5 MIN PRN
Status: DISCONTINUED | OUTPATIENT
Start: 2018-12-19 | End: 2018-12-19 | Stop reason: HOSPADM

## 2018-12-19 RX ORDER — MAGNESIUM HYDROXIDE 1200 MG/15ML
LIQUID ORAL CONTINUOUS PRN
Status: DISCONTINUED | OUTPATIENT
Start: 2018-12-19 | End: 2018-12-19 | Stop reason: HOSPADM

## 2018-12-19 RX ORDER — ONDANSETRON 2 MG/ML
4 INJECTION INTRAMUSCULAR; INTRAVENOUS EVERY 30 MIN PRN
Status: DISCONTINUED | OUTPATIENT
Start: 2018-12-19 | End: 2018-12-19 | Stop reason: HOSPADM

## 2018-12-19 RX ORDER — LABETALOL HYDROCHLORIDE 5 MG/ML
5 INJECTION, SOLUTION INTRAVENOUS
Status: DISCONTINUED | OUTPATIENT
Start: 2018-12-19 | End: 2018-12-19 | Stop reason: HOSPADM

## 2018-12-19 RX ORDER — PROPOFOL 10 MG/ML
INJECTION, EMULSION INTRAVENOUS PRN
Status: DISCONTINUED | OUTPATIENT
Start: 2018-12-19 | End: 2018-12-19 | Stop reason: SDUPTHER

## 2018-12-19 RX ORDER — KETOROLAC TROMETHAMINE 30 MG/ML
INJECTION, SOLUTION INTRAMUSCULAR; INTRAVENOUS PRN
Status: DISCONTINUED | OUTPATIENT
Start: 2018-12-19 | End: 2018-12-19 | Stop reason: SDUPTHER

## 2018-12-19 RX ORDER — OXYCODONE HYDROCHLORIDE AND ACETAMINOPHEN 5; 325 MG/1; MG/1
2 TABLET ORAL PRN
Status: DISCONTINUED | OUTPATIENT
Start: 2018-12-19 | End: 2018-12-19 | Stop reason: HOSPADM

## 2018-12-19 RX ORDER — FENTANYL CITRATE 50 UG/ML
INJECTION, SOLUTION INTRAMUSCULAR; INTRAVENOUS PRN
Status: DISCONTINUED | OUTPATIENT
Start: 2018-12-19 | End: 2018-12-19 | Stop reason: SDUPTHER

## 2018-12-19 RX ORDER — LIDOCAINE HYDROCHLORIDE 10 MG/ML
2 INJECTION, SOLUTION INFILTRATION; PERINEURAL
Status: DISCONTINUED | OUTPATIENT
Start: 2018-12-19 | End: 2018-12-19 | Stop reason: HOSPADM

## 2018-12-19 RX ORDER — DIPHENHYDRAMINE HYDROCHLORIDE 50 MG/ML
6.25 INJECTION INTRAMUSCULAR; INTRAVENOUS
Status: DISCONTINUED | OUTPATIENT
Start: 2018-12-19 | End: 2018-12-19 | Stop reason: HOSPADM

## 2018-12-19 RX ADMIN — ONDANSETRON 4 MG: 2 INJECTION INTRAMUSCULAR; INTRAVENOUS at 07:37

## 2018-12-19 RX ADMIN — ROCURONIUM BROMIDE 50 MG: 10 SOLUTION INTRAVENOUS at 07:37

## 2018-12-19 RX ADMIN — SODIUM CHLORIDE, POTASSIUM CHLORIDE, SODIUM LACTATE AND CALCIUM CHLORIDE: 600; 310; 30; 20 INJECTION, SOLUTION INTRAVENOUS at 06:35

## 2018-12-19 RX ADMIN — FENTANYL CITRATE 100 MCG: 50 INJECTION INTRAMUSCULAR; INTRAVENOUS at 07:37

## 2018-12-19 RX ADMIN — LIDOCAINE HYDROCHLORIDE 60 MG: 20 INJECTION, SOLUTION INFILTRATION; PERINEURAL at 07:37

## 2018-12-19 RX ADMIN — DEXAMETHASONE SODIUM PHOSPHATE 8 MG: 4 INJECTION, SOLUTION INTRAMUSCULAR; INTRAVENOUS at 07:37

## 2018-12-19 RX ADMIN — PROPOFOL 200 MG: 10 INJECTION, EMULSION INTRAVENOUS at 07:37

## 2018-12-19 RX ADMIN — KETOROLAC TROMETHAMINE 30 MG: 30 INJECTION, SOLUTION INTRAMUSCULAR; INTRAVENOUS at 08:01

## 2018-12-19 RX ADMIN — MIDAZOLAM HYDROCHLORIDE 2 MG: 2 INJECTION, SOLUTION INTRAMUSCULAR; INTRAVENOUS at 07:30

## 2018-12-19 RX ADMIN — SUGAMMADEX 200 MG: 100 INJECTION, SOLUTION INTRAVENOUS at 08:07

## 2018-12-19 ASSESSMENT — PULMONARY FUNCTION TESTS
PIF_VALUE: 1
PIF_VALUE: 22
PIF_VALUE: 19
PIF_VALUE: 23
PIF_VALUE: 0
PIF_VALUE: 19
PIF_VALUE: 20
PIF_VALUE: 21
PIF_VALUE: 34
PIF_VALUE: 21
PIF_VALUE: 20
PIF_VALUE: 21
PIF_VALUE: 26
PIF_VALUE: 2
PIF_VALUE: 2
PIF_VALUE: 31
PIF_VALUE: 19
PIF_VALUE: 22
PIF_VALUE: 18
PIF_VALUE: 21
PIF_VALUE: 21
PIF_VALUE: 18
PIF_VALUE: 19
PIF_VALUE: 22
PIF_VALUE: 32
PIF_VALUE: 25
PIF_VALUE: 21
PIF_VALUE: 21
PIF_VALUE: 19
PIF_VALUE: 21
PIF_VALUE: 21
PIF_VALUE: 19
PIF_VALUE: 0
PIF_VALUE: 22
PIF_VALUE: 19
PIF_VALUE: 23
PIF_VALUE: 22
PIF_VALUE: 18
PIF_VALUE: 1
PIF_VALUE: 21
PIF_VALUE: 19
PIF_VALUE: 23

## 2018-12-19 ASSESSMENT — PAIN - FUNCTIONAL ASSESSMENT: PAIN_FUNCTIONAL_ASSESSMENT: 0-10

## 2018-12-19 ASSESSMENT — PAIN SCALES - GENERAL
PAINLEVEL_OUTOF10: 0

## 2018-12-19 ASSESSMENT — PAIN DESCRIPTION - DESCRIPTORS: DESCRIPTORS: CONSTANT;ACHING

## 2018-12-19 NOTE — ANESTHESIA PRE PROCEDURE
Department of Anesthesiology  Preprocedure Note       Name:  Connor Ponce   Age:  41 y.o.  :  1980                                          MRN:  [id number]         Date:  2018      Surgeon: Joseph Wilkinson):  Nikolai Jennings DO    Procedure: VIDEO HYSTEROSCOPY DILATATION AND CURETTAGE AND POSSIBLE MYOSURE (N/A Vagina )    Medications prior to admission:   Prior to Admission medications    Medication Sig Start Date End Date Taking?  Authorizing Provider   ibuprofen (ADVIL;MOTRIN) 800 MG tablet Take 1 tablet by mouth every 8 hours as needed for Pain 12/10/18  Yes Nikolai Jennings DO   medroxyPROGESTERone (DEPO-PROVERA) 150 MG/ML injection Inject 1 mL into the muscle every 3 months 12/3/18  Yes Esthermary Rodriguez DO   spironolactone (ALDACTONE) 25 MG tablet Take 1 tablet by mouth daily 18  Yes Vj Valencia MD   levETIRAcetam (KEPPRA) 500 MG tablet Take 500 mg by mouth 2 times daily  18  Yes Historical Provider, MD   metFORMIN (GLUCOPHAGE) 1000 MG tablet TAKE 1 TABLET BY MOUTH TWICE DAILY WITH MEALS 18  Yes Vj Valencia MD   XARELTO 20 MG TABS tablet TK 1 T PO  Daily 10/28/18   Historical Provider, MD   Blood Glucose Monitoring Suppl (ONE TOUCH ULTRA MINI) w/Device KIT  9/3/18   Historical Provider, MD Ed Nice LANCETS 99D MISC Use to test daily 9/10/18   Vj Valencia MD   ONE TOUCH ULTRA TEST strip Use to test daily 9/10/18   Vj Valencia MD   meloxicam (MOBIC) 15 MG tablet Take 1 tablet by mouth daily  Patient taking differently: Take 15 mg by mouth as needed  18   Vj Valencia MD       Current medications:    Current Facility-Administered Medications   Medication Dose Route Frequency Provider Last Rate Last Dose    lidocaine 1 % injection 2 mL  2 mL Intradermal Once PRN Kwesi Swift MD        lactated ringers infusion   Intravenous Continuous Kwesi Swift  mL/hr at 18 0635      sodium chloride flush 0.9 % injection             complication ([facility] [address] 7/02/3812       Past Surgical History:        Procedure Laterality Date    WISDOM TOOTH EXTRACTION         Social History:    Social History   Substance Use Topics    Smoking status: Never Smoker    Smokeless tobacco: Never Used    Alcohol use No                                Counseling given: Not Answered      Vital Signs (Current):   Vitals:    12/12/18 1525 12/19/18 0616   BP:  (!) 143/91   Pulse:  91   Resp:  16   Temp:  97.2 °F (36.2 °C)   TempSrc:  Temporal   SpO2:  99%   Weight: 280 lb (127 kg) 280 lb (127 kg)   Height: 5' 9\" (1.753 m) 5' 9\" (1.753 m)                                              BP Readings from Last 3 Encounters:   12/19/18 (!) 143/91   12/10/18 122/74   12/07/18 122/76       NPO Status: Time of last liquid consumption: 2351                        Time of last solid consumption: 2100                        Date of last liquid consumption: 12/18/18                        Date of last solid food consumption: 12/18/18    BMI:   Wt Readings from Last 3 Encounters:   12/19/18 280 lb (127 kg)   12/10/18 280 lb 6.4 oz (127.2 kg)   12/07/18 279 lb (126.6 kg)     Body mass index is 41.35 kg/m².     CBC:   Lab Results   Component Value Date    WBC 11.1 12/07/2018    RBC 4.81 12/07/2018    HGB 11.6 12/07/2018    HCT 37.5 12/07/2018    MCV 78.0 12/07/2018    RDW 15.7 12/07/2018     12/07/2018       CMP:   Lab Results   Component Value Date     12/07/2018    K 4.4 12/07/2018    CL 95 12/07/2018    CO2 20 12/07/2018    BUN 14 12/07/2018    CREATININE 0.6 12/07/2018    GFRAA >60 12/07/2018    GFRAA >60 11/03/2012    AGRATIO 1.3 11/12/2018    LABGLOM >60 12/07/2018    GLUCOSE 307 12/07/2018    PROT 7.0 11/12/2018    PROT 7.5 11/03/2012    CALCIUM 9.3 12/07/2018    BILITOT <0.2 11/12/2018    ALKPHOS 89 11/12/2018    AST 14 11/12/2018    ALT 23 11/12/2018       POC Tests:   Recent Labs      12/19/18   0633   POCGLU  267*       Coags: No results found for: PROTIME, INR,

## 2018-12-20 ENCOUNTER — TELEPHONE (OUTPATIENT)
Dept: OBGYN CLINIC | Age: 38
End: 2018-12-20

## 2019-01-02 ENCOUNTER — OFFICE VISIT (OUTPATIENT)
Dept: FAMILY MEDICINE CLINIC | Age: 39
End: 2019-01-02
Payer: COMMERCIAL

## 2019-01-02 VITALS
BODY MASS INDEX: 40.76 KG/M2 | WEIGHT: 276 LBS | OXYGEN SATURATION: 99 % | HEART RATE: 100 BPM | SYSTOLIC BLOOD PRESSURE: 134 MMHG | DIASTOLIC BLOOD PRESSURE: 80 MMHG

## 2019-01-02 DIAGNOSIS — F41.9 ANXIETY AND DEPRESSION: Primary | ICD-10-CM

## 2019-01-02 DIAGNOSIS — F32.A ANXIETY AND DEPRESSION: Primary | ICD-10-CM

## 2019-01-02 PROCEDURE — 99214 OFFICE O/P EST MOD 30 MIN: CPT | Performed by: FAMILY MEDICINE

## 2019-01-02 RX ORDER — VENLAFAXINE HYDROCHLORIDE 37.5 MG/1
37.5 CAPSULE, EXTENDED RELEASE ORAL DAILY
Qty: 30 CAPSULE | Refills: 3 | Status: SHIPPED | OUTPATIENT
Start: 2019-01-02 | End: 2019-03-13 | Stop reason: SDUPTHER

## 2019-01-03 ENCOUNTER — OFFICE VISIT (OUTPATIENT)
Dept: OBGYN CLINIC | Age: 39
End: 2019-01-03

## 2019-01-03 VITALS
TEMPERATURE: 98.1 F | HEART RATE: 89 BPM | BODY MASS INDEX: 40.55 KG/M2 | SYSTOLIC BLOOD PRESSURE: 138 MMHG | WEIGHT: 274.6 LBS | DIASTOLIC BLOOD PRESSURE: 74 MMHG

## 2019-01-03 DIAGNOSIS — N84.0 INTRAUTERINE POLYP: ICD-10-CM

## 2019-01-03 DIAGNOSIS — D25.0 SUBMUCOUS UTERINE FIBROID: ICD-10-CM

## 2019-01-03 DIAGNOSIS — Z48.89 POSTOPERATIVE VISIT: Primary | ICD-10-CM

## 2019-01-03 PROCEDURE — 99024 POSTOP FOLLOW-UP VISIT: CPT | Performed by: OBSTETRICS & GYNECOLOGY

## 2019-01-10 ENCOUNTER — OFFICE VISIT (OUTPATIENT)
Dept: OBGYN CLINIC | Age: 39
End: 2019-01-10
Payer: COMMERCIAL

## 2019-01-10 VITALS
DIASTOLIC BLOOD PRESSURE: 86 MMHG | HEART RATE: 93 BPM | WEIGHT: 270.8 LBS | BODY MASS INDEX: 39.99 KG/M2 | TEMPERATURE: 98.7 F | SYSTOLIC BLOOD PRESSURE: 138 MMHG

## 2019-01-10 DIAGNOSIS — N93.9 VAGINAL BLEEDING: Primary | ICD-10-CM

## 2019-01-10 DIAGNOSIS — N76.6 VULVAR ULCER: ICD-10-CM

## 2019-01-10 PROCEDURE — 99213 OFFICE O/P EST LOW 20 MIN: CPT | Performed by: OBSTETRICS & GYNECOLOGY

## 2019-01-10 RX ORDER — ASPIRIN 81 MG/1
81 TABLET, CHEWABLE ORAL PRN
COMMUNITY
End: 2021-02-08

## 2019-01-10 RX ORDER — ACYCLOVIR 400 MG/1
400 TABLET ORAL 3 TIMES DAILY
Qty: 30 TABLET | Refills: 0 | Status: SHIPPED | OUTPATIENT
Start: 2019-01-10 | End: 2019-01-20

## 2019-01-13 LAB
FINAL REPORT: NORMAL
HERPES TYPE 1/2 IGM COMBINED: 0.46 IV
HERPES TYPE I/II IGG COMBINED: 0.62 IV
PRELIMINARY: NORMAL

## 2019-01-30 ENCOUNTER — TELEPHONE (OUTPATIENT)
Dept: OBGYN CLINIC | Age: 39
End: 2019-01-30

## 2019-01-30 DIAGNOSIS — N93.9 ABNORMAL UTERINE BLEEDING: Primary | ICD-10-CM

## 2019-01-30 RX ORDER — MEDROXYPROGESTERONE ACETATE 10 MG/1
10 TABLET ORAL 2 TIMES DAILY
Qty: 10 TABLET | Refills: 0 | Status: SHIPPED | OUTPATIENT
Start: 2019-01-30 | End: 2019-02-10 | Stop reason: SDUPTHER

## 2019-02-05 ENCOUNTER — PATIENT MESSAGE (OUTPATIENT)
Dept: OBGYN CLINIC | Age: 39
End: 2019-02-05

## 2019-02-10 RX ORDER — MEDROXYPROGESTERONE ACETATE 10 MG/1
10 TABLET ORAL 3 TIMES DAILY
Qty: 30 TABLET | Refills: 0 | Status: SHIPPED | OUTPATIENT
Start: 2019-02-10 | End: 2021-04-12 | Stop reason: ALTCHOICE

## 2019-02-22 ENCOUNTER — NURSE ONLY (OUTPATIENT)
Dept: OBGYN CLINIC | Age: 39
End: 2019-02-22
Payer: COMMERCIAL

## 2019-02-22 VITALS
DIASTOLIC BLOOD PRESSURE: 64 MMHG | HEIGHT: 69 IN | BODY MASS INDEX: 39.55 KG/M2 | SYSTOLIC BLOOD PRESSURE: 132 MMHG | TEMPERATURE: 98.1 F | WEIGHT: 267 LBS | HEART RATE: 101 BPM

## 2019-02-22 DIAGNOSIS — Z30.42 ENCOUNTER FOR SURVEILLANCE OF INJECTABLE CONTRACEPTIVE: Primary | ICD-10-CM

## 2019-02-22 PROCEDURE — 96372 THER/PROPH/DIAG INJ SC/IM: CPT | Performed by: OBSTETRICS & GYNECOLOGY

## 2019-02-22 RX ORDER — MEDROXYPROGESTERONE ACETATE 150 MG/ML
150 INJECTION, SUSPENSION INTRAMUSCULAR ONCE
Status: COMPLETED | OUTPATIENT
Start: 2019-02-22 | End: 2019-02-22

## 2019-02-22 RX ADMIN — MEDROXYPROGESTERONE ACETATE 150 MG: 150 INJECTION, SUSPENSION INTRAMUSCULAR at 09:05

## 2019-03-01 ENCOUNTER — TELEPHONE (OUTPATIENT)
Dept: OBGYN CLINIC | Age: 39
End: 2019-03-01

## 2019-03-01 DIAGNOSIS — N93.9 ABNORMAL UTERINE BLEEDING: Primary | ICD-10-CM

## 2019-03-01 RX ORDER — MEDROXYPROGESTERONE ACETATE 10 MG/1
20 TABLET ORAL 3 TIMES DAILY
Qty: 90 TABLET | Refills: 3 | Status: SHIPPED | OUTPATIENT
Start: 2019-03-01 | End: 2021-02-08

## 2019-03-13 ENCOUNTER — OFFICE VISIT (OUTPATIENT)
Dept: FAMILY MEDICINE CLINIC | Age: 39
End: 2019-03-13
Payer: COMMERCIAL

## 2019-03-13 VITALS
OXYGEN SATURATION: 98 % | HEART RATE: 99 BPM | SYSTOLIC BLOOD PRESSURE: 134 MMHG | BODY MASS INDEX: 39.87 KG/M2 | DIASTOLIC BLOOD PRESSURE: 86 MMHG | WEIGHT: 270 LBS

## 2019-03-13 DIAGNOSIS — Z13.31 POSITIVE DEPRESSION SCREENING: ICD-10-CM

## 2019-03-13 DIAGNOSIS — F32.A ANXIETY AND DEPRESSION: ICD-10-CM

## 2019-03-13 DIAGNOSIS — F41.9 ANXIETY AND DEPRESSION: ICD-10-CM

## 2019-03-13 DIAGNOSIS — E66.01 MORBID OBESITY (HCC): Primary | ICD-10-CM

## 2019-03-13 PROCEDURE — 99214 OFFICE O/P EST MOD 30 MIN: CPT | Performed by: FAMILY MEDICINE

## 2019-03-13 PROCEDURE — G0444 DEPRESSION SCREEN ANNUAL: HCPCS | Performed by: FAMILY MEDICINE

## 2019-03-13 PROCEDURE — G8431 POS CLIN DEPRES SCRN F/U DOC: HCPCS | Performed by: FAMILY MEDICINE

## 2019-03-13 RX ORDER — DOXYCYCLINE HYCLATE 50 MG/1
CAPSULE, GELATIN COATED ORAL
Refills: 3 | COMMUNITY
Start: 2019-01-16 | End: 2021-02-08

## 2019-03-13 RX ORDER — VENLAFAXINE HYDROCHLORIDE 75 MG/1
75 CAPSULE, EXTENDED RELEASE ORAL DAILY
Qty: 30 CAPSULE | Refills: 3 | Status: SHIPPED | OUTPATIENT
Start: 2019-03-13 | End: 2019-08-20 | Stop reason: SDUPTHER

## 2019-03-13 ASSESSMENT — PATIENT HEALTH QUESTIONNAIRE - PHQ9
6. FEELING BAD ABOUT YOURSELF - OR THAT YOU ARE A FAILURE OR HAVE LET YOURSELF OR YOUR FAMILY DOWN: 2
1. LITTLE INTEREST OR PLEASURE IN DOING THINGS: 3
2. FEELING DOWN, DEPRESSED OR HOPELESS: 3
7. TROUBLE CONCENTRATING ON THINGS, SUCH AS READING THE NEWSPAPER OR WATCHING TELEVISION: 0
SUM OF ALL RESPONSES TO PHQ9 QUESTIONS 1 & 2: 6
3. TROUBLE FALLING OR STAYING ASLEEP: 3
8. MOVING OR SPEAKING SO SLOWLY THAT OTHER PEOPLE COULD HAVE NOTICED. OR THE OPPOSITE, BEING SO FIGETY OR RESTLESS THAT YOU HAVE BEEN MOVING AROUND A LOT MORE THAN USUAL: 0
10. IF YOU CHECKED OFF ANY PROBLEMS, HOW DIFFICULT HAVE THESE PROBLEMS MADE IT FOR YOU TO DO YOUR WORK, TAKE CARE OF THINGS AT HOME, OR GET ALONG WITH OTHER PEOPLE: 3
SUM OF ALL RESPONSES TO PHQ QUESTIONS 1-9: 16
9. THOUGHTS THAT YOU WOULD BE BETTER OFF DEAD, OR OF HURTING YOURSELF: 0
SUM OF ALL RESPONSES TO PHQ QUESTIONS 1-9: 16
4. FEELING TIRED OR HAVING LITTLE ENERGY: 3
5. POOR APPETITE OR OVEREATING: 2

## 2019-03-19 ENCOUNTER — OFFICE VISIT (OUTPATIENT)
Dept: OBGYN CLINIC | Age: 39
End: 2019-03-19
Payer: COMMERCIAL

## 2019-03-19 VITALS
SYSTOLIC BLOOD PRESSURE: 124 MMHG | TEMPERATURE: 97.5 F | WEIGHT: 266.4 LBS | HEART RATE: 92 BPM | BODY MASS INDEX: 39.34 KG/M2 | DIASTOLIC BLOOD PRESSURE: 76 MMHG

## 2019-03-19 DIAGNOSIS — E11.9 TYPE 2 DIABETES MELLITUS WITHOUT COMPLICATION, WITHOUT LONG-TERM CURRENT USE OF INSULIN (HCC): ICD-10-CM

## 2019-03-19 DIAGNOSIS — E66.01 CLASS 3 SEVERE OBESITY DUE TO EXCESS CALORIES WITHOUT SERIOUS COMORBIDITY WITH BODY MASS INDEX (BMI) OF 40.0 TO 44.9 IN ADULT (HCC): ICD-10-CM

## 2019-03-19 DIAGNOSIS — D68.51 HETEROZYGOUS FACTOR V LEIDEN MUTATION (HCC): ICD-10-CM

## 2019-03-19 DIAGNOSIS — Z86.718 HX OF CEREBRAL VENOUS SINUS THROMBOSIS: ICD-10-CM

## 2019-03-19 DIAGNOSIS — N93.9 ABNORMAL UTERINE BLEEDING: Primary | ICD-10-CM

## 2019-03-19 PROCEDURE — 99212 OFFICE O/P EST SF 10 MIN: CPT | Performed by: OBSTETRICS & GYNECOLOGY

## 2019-03-21 ENCOUNTER — TELEPHONE (OUTPATIENT)
Dept: OBGYN CLINIC | Age: 39
End: 2019-03-21

## 2019-03-21 NOTE — TELEPHONE ENCOUNTER
Started IUD Process    IUD benefits summary    Copay- 0  Covered at 100%  [facility]    Spoke to [facility]  No authorization required   Ref number [id number]    [Pt will pay office visit copay when she comes in to get IUD Inserted]

## 2019-03-27 ENCOUNTER — OFFICE VISIT (OUTPATIENT)
Dept: FAMILY MEDICINE CLINIC | Age: 39
End: 2019-03-27
Payer: COMMERCIAL

## 2019-03-27 VITALS
WEIGHT: 264 LBS | BODY MASS INDEX: 38.99 KG/M2 | HEART RATE: 103 BPM | OXYGEN SATURATION: 99 % | SYSTOLIC BLOOD PRESSURE: 120 MMHG | DIASTOLIC BLOOD PRESSURE: 68 MMHG

## 2019-03-27 DIAGNOSIS — E66.01 MORBID OBESITY (HCC): ICD-10-CM

## 2019-03-27 PROCEDURE — 99214 OFFICE O/P EST MOD 30 MIN: CPT | Performed by: FAMILY MEDICINE

## 2019-04-04 ASSESSMENT — ENCOUNTER SYMPTOMS
TROUBLE SWALLOWING: 0
SHORTNESS OF BREATH: 0
DIARRHEA: 0
CHEST TIGHTNESS: 0
COLOR CHANGE: 0
CONSTIPATION: 0
BACK PAIN: 0
EYE PAIN: 0
RHINORRHEA: 0

## 2019-04-04 NOTE — TELEPHONE ENCOUNTER
Left message for patient to recall the office.   We received IUD in office     Pt need to call on next menses to schedule insertion

## 2019-04-04 NOTE — PROGRESS NOTES
3/27/2019     Miguel A Pepe (:  1980) is a 41 y.o. female, here for evaluation of the following medical concerns:    Billy Walker is a 41 y.o. female. Patient presents with: Other: 2 wk f/u   Medication Check      Patient would like to discuss treatment for obesity. She has tried many diet and exercise programs in the past but has not been able to lose weight. She tries to eat well and continues to exercise. She would like to have some help through medication. She has taken adipex in the past and this has been helpful. She does have PCOS and takes metformin. The patients PMH, surgical history, family history, medications, allergies were all reviewed and updated as appropriate today. Review of Systems   Constitutional: Negative for fatigue and unexpected weight change. HENT: Negative for congestion, rhinorrhea and trouble swallowing. Eyes: Negative for pain and visual disturbance. Respiratory: Negative for chest tightness and shortness of breath. Cardiovascular: Negative for chest pain and palpitations. Gastrointestinal: Negative for constipation and diarrhea. Endocrine: Negative for cold intolerance and heat intolerance. Genitourinary: Negative for frequency and urgency. Musculoskeletal: Negative for arthralgias and back pain. Skin: Negative for color change and rash. Allergic/Immunologic: Negative for environmental allergies and food allergies. Neurological: Negative for dizziness and light-headedness. Hematological: Negative for adenopathy. Does not bruise/bleed easily. Psychiatric/Behavioral: Negative for dysphoric mood and sleep disturbance. Prior to Visit Medications    Medication Sig Taking? Authorizing Provider   Phentermine-Topiramate (QSYMIA) 7.5-46 MG CP24 Take 1 tablet by mouth daily for 30 days.  Yes Madelyn Alegre MD   ferrous gluconate (FERGON) 324 (38 Fe) MG tablet TK 1 T TID WITH MEALS Yes Historical Provider, MD   venlafaxine External ear normal.   Nose: Nose normal.   Mouth/Throat: Oropharynx is clear and moist.   Eyes: Pupils are equal, round, and reactive to light. Conjunctivae are normal.   Neck: Normal range of motion. Neck supple. Cardiovascular: Normal rate, regular rhythm, normal heart sounds and intact distal pulses. Pulmonary/Chest: Effort normal and breath sounds normal.   Abdominal: Soft. Bowel sounds are normal.   Musculoskeletal: Normal range of motion. Neurological: She is alert and oriented to person, place, and time. She has normal reflexes. Skin: Skin is dry. Psychiatric: She has a normal mood and affect. Her behavior is normal. Judgment and thought content normal.   Nursing note and vitals reviewed. ASSESSMENT/PLAN:  1. Morbid obesity ([address]  Risks/benefits/side effects and alternatives discussed. - Phentermine-Topiramate (QSYMIA) 7.5-46 MG CP24; Take 1 tablet by mouth daily for 30 days. Dispense: 30 capsule; Refill: 0      No follow-ups on file. An electronic signature was used to authenticate this note.     --Alyson Steele MD on 4/4/2019 at 9:05 AM

## 2019-04-09 ENCOUNTER — OFFICE VISIT (OUTPATIENT)
Dept: OBGYN CLINIC | Age: 39
End: 2019-04-09
Payer: COMMERCIAL

## 2019-04-09 VITALS
WEIGHT: 261 LBS | SYSTOLIC BLOOD PRESSURE: 138 MMHG | HEART RATE: 108 BPM | TEMPERATURE: 98.5 F | BODY MASS INDEX: 38.66 KG/M2 | HEIGHT: 69 IN | DIASTOLIC BLOOD PRESSURE: 96 MMHG

## 2019-04-09 DIAGNOSIS — Z30.430 ENCOUNTER FOR INTRAUTERINE DEVICE PLACEMENT: Primary | ICD-10-CM

## 2019-04-09 LAB
CONTROL: NORMAL
PREGNANCY TEST URINE, POC: NEGATIVE

## 2019-04-09 PROCEDURE — 81025 URINE PREGNANCY TEST: CPT | Performed by: OBSTETRICS & GYNECOLOGY

## 2019-04-09 PROCEDURE — 58300 INSERT INTRAUTERINE DEVICE: CPT | Performed by: OBSTETRICS & GYNECOLOGY

## 2019-04-09 RX ORDER — HYDROCODONE BITARTRATE AND ACETAMINOPHEN 5; 325 MG/1; MG/1
1 TABLET ORAL EVERY 6 HOURS PRN
Qty: 10 TABLET | Refills: 0 | Status: SHIPPED | OUTPATIENT
Start: 2019-04-09 | End: 2019-04-12

## 2019-04-10 LAB
C. TRACHOMATIS DNA ,URINE: NEGATIVE
N. GONORRHOEAE DNA, URINE: NEGATIVE

## 2019-04-11 ENCOUNTER — PATIENT MESSAGE (OUTPATIENT)
Dept: OBGYN CLINIC | Age: 39
End: 2019-04-11

## 2019-04-12 RX ORDER — ACYCLOVIR 400 MG/1
400 TABLET ORAL
Qty: 50 TABLET | Refills: 0 | Status: SHIPPED | OUTPATIENT
Start: 2019-04-12 | End: 2019-04-22

## 2019-04-24 ENCOUNTER — OFFICE VISIT (OUTPATIENT)
Dept: FAMILY MEDICINE CLINIC | Age: 39
End: 2019-04-24
Payer: COMMERCIAL

## 2019-04-24 VITALS
BODY MASS INDEX: 38.69 KG/M2 | SYSTOLIC BLOOD PRESSURE: 122 MMHG | DIASTOLIC BLOOD PRESSURE: 80 MMHG | WEIGHT: 262 LBS | HEART RATE: 115 BPM | OXYGEN SATURATION: 98 %

## 2019-04-24 DIAGNOSIS — L85.9 HYPERKERATOSIS: ICD-10-CM

## 2019-04-24 DIAGNOSIS — E11.9 TYPE 2 DIABETES MELLITUS WITHOUT COMPLICATION, WITHOUT LONG-TERM CURRENT USE OF INSULIN (HCC): Primary | ICD-10-CM

## 2019-04-24 DIAGNOSIS — E66.01 MORBID OBESITY (HCC): ICD-10-CM

## 2019-04-24 LAB — HBA1C MFR BLD: 11.6 %

## 2019-04-24 PROCEDURE — 83036 HEMOGLOBIN GLYCOSYLATED A1C: CPT | Performed by: FAMILY MEDICINE

## 2019-04-24 PROCEDURE — 99214 OFFICE O/P EST MOD 30 MIN: CPT | Performed by: FAMILY MEDICINE

## 2019-04-24 RX ORDER — AMMONIUM LACTATE 12 G/100G
LOTION TOPICAL
Qty: 225 G | Refills: 0 | Status: SHIPPED | OUTPATIENT
Start: 2019-04-24 | End: 2021-02-08

## 2019-05-04 ASSESSMENT — ENCOUNTER SYMPTOMS
BACK PAIN: 0
CONSTIPATION: 0
TROUBLE SWALLOWING: 0
CHEST TIGHTNESS: 0
SHORTNESS OF BREATH: 0
RHINORRHEA: 0
DIARRHEA: 0
COLOR CHANGE: 0
EYE PAIN: 0

## 2019-05-04 NOTE — PROGRESS NOTES
2019     Miguel A Pepe (:  1980) is a 41 y.o. female, here for evaluation of the following medical concerns:    Darlene Gillis is a 41 y.o. female. Patient presents with: Other: 2 wk f/u   Medication Check      Patient would like to discuss treatment for obesity. She has tried many diet and exercise programs in the past but has not been able to lose weight. She tries to eat well and continues to exercise. She would like to have some help through medication. She has taken adipex in the past and this has been helpful. She does have PCOS and takes metformin. The patients PMH, surgical history, family history, medications, allergies were all reviewed and updated as appropriate today. Review of Systems   Constitutional: Negative for fatigue and unexpected weight change. HENT: Negative for congestion, rhinorrhea and trouble swallowing. Eyes: Negative for pain and visual disturbance. Respiratory: Negative for chest tightness and shortness of breath. Cardiovascular: Negative for chest pain and palpitations. Gastrointestinal: Negative for constipation and diarrhea. Endocrine: Negative for cold intolerance and heat intolerance. Genitourinary: Negative for frequency and urgency. Musculoskeletal: Negative for arthralgias and back pain. Skin: Negative for color change and rash. Allergic/Immunologic: Negative for environmental allergies and food allergies. Neurological: Negative for dizziness and light-headedness. Hematological: Negative for adenopathy. Does not bruise/bleed easily. Psychiatric/Behavioral: Negative for dysphoric mood and sleep disturbance. Prior to Visit Medications    Medication Sig Taking? Authorizing Provider   levonorgestrel (MIRENA, 52 MG,) IUD 52 mg 1 each by Intrauterine route once Yes Historical Provider, MD   Phentermine-Topiramate (QSYMIA) 11.25-69 MG CP24 Take 1 tablet by mouth daily for 30 days.  Yes Octavia Ortiz MD   ammonium

## 2019-05-13 ENCOUNTER — OFFICE VISIT (OUTPATIENT)
Dept: OBGYN CLINIC | Age: 39
End: 2019-05-13
Payer: COMMERCIAL

## 2019-05-13 VITALS
BODY MASS INDEX: 38.84 KG/M2 | HEART RATE: 98 BPM | TEMPERATURE: 97.4 F | SYSTOLIC BLOOD PRESSURE: 118 MMHG | WEIGHT: 263 LBS | DIASTOLIC BLOOD PRESSURE: 82 MMHG

## 2019-05-13 DIAGNOSIS — Z86.718 HX OF CEREBRAL VENOUS SINUS THROMBOSIS: ICD-10-CM

## 2019-05-13 DIAGNOSIS — N93.9 ABNORMAL UTERINE BLEEDING: ICD-10-CM

## 2019-05-13 DIAGNOSIS — E66.01 CLASS 3 SEVERE OBESITY DUE TO EXCESS CALORIES WITHOUT SERIOUS COMORBIDITY WITH BODY MASS INDEX (BMI) OF 40.0 TO 44.9 IN ADULT (HCC): ICD-10-CM

## 2019-05-13 DIAGNOSIS — D68.51 HETEROZYGOUS FACTOR V LEIDEN MUTATION (HCC): ICD-10-CM

## 2019-05-13 DIAGNOSIS — Z30.431 IUD CHECK UP: Primary | ICD-10-CM

## 2019-05-13 DIAGNOSIS — E11.9 TYPE 2 DIABETES MELLITUS WITHOUT COMPLICATION, WITHOUT LONG-TERM CURRENT USE OF INSULIN (HCC): ICD-10-CM

## 2019-05-13 DIAGNOSIS — N94.9 ADNEXAL CYST: ICD-10-CM

## 2019-05-13 PROCEDURE — 76856 US EXAM PELVIC COMPLETE: CPT | Performed by: OBSTETRICS & GYNECOLOGY

## 2019-05-13 PROCEDURE — 99213 OFFICE O/P EST LOW 20 MIN: CPT | Performed by: OBSTETRICS & GYNECOLOGY

## 2019-05-26 NOTE — PROGRESS NOTES
[facility] Ob/[facility]yn   Return Gyn Office Visit    CC:   Chief Complaint   Patient presents with    Follow-up     US for IUD placement        HPI:  46 y.o. who presents to [facility] for IUD recheck    Pt states she is doing fair. Her IUD was improving her bleeding - admits to only light spotting few weeks after placement. Bleeding has now become heavy again and she reports it is constant since the last week of April. Denies pelvic pain. Currently not on anti-coagulation. Denies fever / chills / vaginal discharge / vaginal pain. Review of Systems - The following ROS was otherwise negative, except as noted in the HPI: constitutional, respiratory, cardiovascular, gastrointestinal, genitourinary    Objective:  /82 (Site: Left Upper Arm, Position: Sitting, Cuff Size: Large Adult)   Pulse 98   Temp 97.4 °F (36.3 °C) (Oral)   Wt 263 lb (119.3 kg)   LMP 12/19/2018   BMI 38.84 kg/m²   General: Alert, well appearing, no acute distress   Resp effort within normal limits   Abdomen: Soft, nontender, nondistended   Pelvic: Deferred   Skin: No visible lesions / rashes / concerning nevus   Extremities: No redness or tenderness, neg Milad's sign  Osteopathic: no TART changes    Images:  PELVIC ULTRASOUND without DOPPLER INTERROGATION   NON OB    DATE: 5/13/19    PHYSICIAN: GALI John.     SONOGRAPHER: CUONG Adame [facility]    INDICATION: IUD palcement    TYPE OF SCAN: vaginal    FINDINGS:    The cul de sac is normal. No free fluid is appreciated. The cervix is normal and not enlarged. Nabothian cyst/s is not noted within the uterine cervix. The uterus measures 6.83cm x 5.93cm x 4.41cm. The uterus is retroverted. The endometrium measures 4.84 mm. An IUD is seen within the endometrial canal.  The myometrium is homogeneous in appearance. No uterine anomalies are noted. The right ovary is present and normal.  The right ovary measures 2.65cm x 2.73cm x 3.31cm. Ovary findings:  no masses seen.   The right

## 2019-06-11 ENCOUNTER — TELEPHONE (OUTPATIENT)
Dept: OBGYN CLINIC | Age: 39
End: 2019-06-11

## 2019-06-11 NOTE — TELEPHONE ENCOUNTER
----- Message from Andrew Tafoya DO sent at 5/26/2019  1:45 PM EDT -----  Can we ensure that Meagan Arzola has a FU apt in 3-5 months for bleeding check and repeat Pelvic US. I was reviewing her chart and was not if an US was added. She has a cystic area on her left adnexa that I want to check out. It was not there in November 2018.      Thanks   [facility]

## 2019-06-11 NOTE — TELEPHONE ENCOUNTER
Spoke to patient regarding appointment. States she works nights and would have to check her schedule to see what day would be convenient for her. Patient aware to schedule ultrasound and follow up between Aug-Sept. States she will recall the office for scheduling. Added recall in system for reminder.

## 2019-06-30 ENCOUNTER — PATIENT MESSAGE (OUTPATIENT)
Dept: OBGYN CLINIC | Age: 39
End: 2019-06-30

## 2019-07-01 RX ORDER — ACYCLOVIR 400 MG/1
400 TABLET ORAL 3 TIMES DAILY
Qty: 15 TABLET | Refills: 2 | Status: SHIPPED | OUTPATIENT
Start: 2019-07-01 | End: 2019-07-06

## 2019-07-01 NOTE — TELEPHONE ENCOUNTER
From: Joellen Pepe  To: Jenifer Sweeney DO  Sent: 6/30/2019 2:21 AM EDT  Subject: Prescription Question    feel like I might be having a out break of hsv . Have 2 sores some uncomfortableness and itching sometimes. Just wondering if a prescription can be put in for me at my pharmacy to be on the safe side Thanks. Mauro Carlson. I take acyclovir.  My [employer]

## 2019-07-30 ENCOUNTER — OFFICE VISIT (OUTPATIENT)
Dept: OBGYN CLINIC | Age: 39
End: 2019-07-30
Payer: COMMERCIAL

## 2019-07-30 VITALS
HEART RATE: 102 BPM | SYSTOLIC BLOOD PRESSURE: 136 MMHG | TEMPERATURE: 98.2 F | BODY MASS INDEX: 39.19 KG/M2 | DIASTOLIC BLOOD PRESSURE: 80 MMHG | WEIGHT: 265.4 LBS

## 2019-07-30 DIAGNOSIS — R10.2 VAGINAL PAIN: Primary | ICD-10-CM

## 2019-07-30 DIAGNOSIS — N95.2 VAGINAL ATROPHY: ICD-10-CM

## 2019-07-30 DIAGNOSIS — N89.8 VAGINAL DRYNESS: ICD-10-CM

## 2019-07-30 PROCEDURE — 99213 OFFICE O/P EST LOW 20 MIN: CPT | Performed by: OBSTETRICS & GYNECOLOGY

## 2019-07-30 RX ORDER — ESTRADIOL 0.1 MG/G
1 CREAM VAGINAL DAILY
Qty: 1 TUBE | Refills: 3 | Status: SHIPPED | OUTPATIENT
Start: 2019-07-30

## 2019-07-31 LAB
CANDIDA SPECIES, DNA PROBE: ABNORMAL
GARDNERELLA VAGINALIS, DNA PROBE: ABNORMAL
TRICHOMONAS VAGINALIS DNA: ABNORMAL

## 2019-07-31 RX ORDER — FLUCONAZOLE 150 MG/1
150 TABLET ORAL DAILY
Qty: 3 TABLET | Refills: 0 | Status: SHIPPED | OUTPATIENT
Start: 2019-07-31 | End: 2019-08-07

## 2019-11-04 ENCOUNTER — PATIENT MESSAGE (OUTPATIENT)
Dept: OBGYN CLINIC | Age: 39
End: 2019-11-04

## 2019-11-04 RX ORDER — FLUCONAZOLE 150 MG/1
TABLET ORAL
Qty: 2 TABLET | Refills: 0 | Status: SHIPPED | OUTPATIENT
Start: 2019-11-04 | End: 2019-11-25

## 2019-11-13 ENCOUNTER — OFFICE VISIT (OUTPATIENT)
Dept: FAMILY MEDICINE CLINIC | Age: 39
End: 2019-11-13
Payer: COMMERCIAL

## 2019-11-13 VITALS
OXYGEN SATURATION: 98 % | SYSTOLIC BLOOD PRESSURE: 130 MMHG | WEIGHT: 263 LBS | HEART RATE: 98 BPM | BODY MASS INDEX: 38.84 KG/M2 | DIASTOLIC BLOOD PRESSURE: 70 MMHG

## 2019-11-13 DIAGNOSIS — E11.9 TYPE 2 DIABETES MELLITUS WITHOUT COMPLICATION, WITHOUT LONG-TERM CURRENT USE OF INSULIN (HCC): Primary | ICD-10-CM

## 2019-11-13 DIAGNOSIS — Z23 NEED FOR PROPHYLACTIC VACCINATION AND INOCULATION AGAINST INFLUENZA: ICD-10-CM

## 2019-11-13 LAB
A/G RATIO: 2 (ref 1.1–2.2)
ALBUMIN SERPL-MCNC: 4.5 G/DL (ref 3.4–5)
ALP BLD-CCNC: 81 U/L (ref 40–129)
ALT SERPL-CCNC: 22 U/L (ref 10–40)
ANION GAP SERPL CALCULATED.3IONS-SCNC: 15 MMOL/L (ref 3–16)
AST SERPL-CCNC: 14 U/L (ref 15–37)
BILIRUB SERPL-MCNC: 0.3 MG/DL (ref 0–1)
BUN BLDV-MCNC: 14 MG/DL (ref 7–20)
CALCIUM SERPL-MCNC: 9.7 MG/DL (ref 8.3–10.6)
CHLORIDE BLD-SCNC: 97 MMOL/L (ref 99–110)
CHOLESTEROL, TOTAL: 172 MG/DL (ref 0–199)
CO2: 23 MMOL/L (ref 21–32)
CREAT SERPL-MCNC: 0.5 MG/DL (ref 0.6–1.1)
GFR AFRICAN AMERICAN: >60
GFR NON-AFRICAN AMERICAN: >60
GLOBULIN: 2.2 G/DL
GLUCOSE BLD-MCNC: 208 MG/DL (ref 70–99)
HBA1C MFR BLD: 11.8 %
HDLC SERPL-MCNC: 49 MG/DL (ref 40–60)
LDL CHOLESTEROL CALCULATED: 102 MG/DL
POTASSIUM SERPL-SCNC: 4.3 MMOL/L (ref 3.5–5.1)
SODIUM BLD-SCNC: 135 MMOL/L (ref 136–145)
TOTAL PROTEIN: 6.7 G/DL (ref 6.4–8.2)
TRIGL SERPL-MCNC: 103 MG/DL (ref 0–150)
VLDLC SERPL CALC-MCNC: 21 MG/DL

## 2019-11-13 PROCEDURE — 90686 IIV4 VACC NO PRSV 0.5 ML IM: CPT | Performed by: FAMILY MEDICINE

## 2019-11-13 PROCEDURE — 99214 OFFICE O/P EST MOD 30 MIN: CPT | Performed by: FAMILY MEDICINE

## 2019-11-13 PROCEDURE — 90471 IMMUNIZATION ADMIN: CPT | Performed by: FAMILY MEDICINE

## 2019-11-13 PROCEDURE — 83036 HEMOGLOBIN GLYCOSYLATED A1C: CPT | Performed by: FAMILY MEDICINE

## 2019-11-13 RX ORDER — TRIAMCINOLONE ACETONIDE 1 MG/G
CREAM TOPICAL
Qty: 30 G | Refills: 0 | Status: SHIPPED | OUTPATIENT
Start: 2019-11-13 | End: 2019-12-04 | Stop reason: SDUPTHER

## 2019-11-13 RX ORDER — PIOGLITAZONEHYDROCHLORIDE 15 MG/1
15 TABLET ORAL DAILY
Qty: 30 TABLET | Refills: 3 | Status: SHIPPED | OUTPATIENT
Start: 2019-11-13 | End: 2020-03-13

## 2019-11-15 ENCOUNTER — PATIENT MESSAGE (OUTPATIENT)
Dept: FAMILY MEDICINE CLINIC | Age: 39
End: 2019-11-15

## 2019-11-17 ENCOUNTER — PATIENT MESSAGE (OUTPATIENT)
Dept: OBGYN CLINIC | Age: 39
End: 2019-11-17

## 2019-11-18 RX ORDER — ACYCLOVIR 400 MG/1
400 TABLET ORAL 3 TIMES DAILY
Qty: 30 TABLET | Refills: 0 | Status: SHIPPED | OUTPATIENT
Start: 2019-11-18 | End: 2019-11-25

## 2019-11-24 ASSESSMENT — ENCOUNTER SYMPTOMS
EYE PAIN: 0
BACK PAIN: 0
CONSTIPATION: 0
CHEST TIGHTNESS: 0
COLOR CHANGE: 0
SHORTNESS OF BREATH: 0
RHINORRHEA: 0
DIARRHEA: 0
TROUBLE SWALLOWING: 0

## 2019-11-25 ENCOUNTER — OFFICE VISIT (OUTPATIENT)
Dept: OBGYN CLINIC | Age: 39
End: 2019-11-25
Payer: COMMERCIAL

## 2019-11-25 VITALS
HEART RATE: 87 BPM | BODY MASS INDEX: 39.1 KG/M2 | SYSTOLIC BLOOD PRESSURE: 118 MMHG | DIASTOLIC BLOOD PRESSURE: 78 MMHG | WEIGHT: 264.8 LBS | TEMPERATURE: 98 F

## 2019-11-25 DIAGNOSIS — A60.9 HSV (HERPES SIMPLEX VIRUS) ANOGENITAL INFECTION: ICD-10-CM

## 2019-11-25 DIAGNOSIS — N89.8 VAGINAL IRRITATION: ICD-10-CM

## 2019-11-25 DIAGNOSIS — N95.2 VAGINAL ATROPHY: ICD-10-CM

## 2019-11-25 DIAGNOSIS — N95.2 ATROPHIC VAGINITIS: Primary | ICD-10-CM

## 2019-11-25 LAB — LUTEINIZING HORMONE: 3.7 MIU/ML

## 2019-11-25 PROCEDURE — 99213 OFFICE O/P EST LOW 20 MIN: CPT | Performed by: OBSTETRICS & GYNECOLOGY

## 2019-11-25 RX ORDER — ACYCLOVIR 800 MG/1
800 TABLET ORAL DAILY
Qty: 30 TABLET | Refills: 11 | Status: SHIPPED | OUTPATIENT
Start: 2019-11-25 | End: 2021-02-04 | Stop reason: SDUPTHER

## 2019-11-25 RX ORDER — FLUCONAZOLE 150 MG/1
150 TABLET ORAL PRN
Qty: 30 TABLET | Refills: 1 | Status: SHIPPED | OUTPATIENT
Start: 2019-11-25 | End: 2020-09-03

## 2019-11-26 LAB
CANDIDA SPECIES, DNA PROBE: NORMAL
ESTRADIOL LEVEL: 180 PG/ML
FOLLICLE STIMULATING HORMONE: 2.1 MIU/ML
GARDNERELLA VAGINALIS, DNA PROBE: NORMAL
TRICHOMONAS VAGINALIS DNA: NORMAL

## 2020-01-15 RX ORDER — TRIAMCINOLONE ACETONIDE 1 MG/G
CREAM TOPICAL
Qty: 30 G | Refills: 0 | Status: SHIPPED | OUTPATIENT
Start: 2020-01-15 | End: 2020-02-19

## 2020-02-19 RX ORDER — TRIAMCINOLONE ACETONIDE 1 MG/G
CREAM TOPICAL
Qty: 30 G | Refills: 0 | Status: SHIPPED | OUTPATIENT
Start: 2020-02-19 | End: 2020-03-13

## 2020-03-13 RX ORDER — TRIAMCINOLONE ACETONIDE 1 MG/G
CREAM TOPICAL
Qty: 30 G | Refills: 0 | Status: SHIPPED | OUTPATIENT
Start: 2020-03-13 | End: 2020-05-15

## 2020-03-13 RX ORDER — PIOGLITAZONEHYDROCHLORIDE 15 MG/1
15 TABLET ORAL DAILY
Qty: 30 TABLET | Refills: 3 | Status: SHIPPED | OUTPATIENT
Start: 2020-03-13 | End: 2020-11-30 | Stop reason: SDUPTHER

## 2020-03-13 RX ORDER — VENLAFAXINE HYDROCHLORIDE 75 MG/1
75 CAPSULE, EXTENDED RELEASE ORAL DAILY
Qty: 30 CAPSULE | Refills: 5 | Status: SHIPPED | OUTPATIENT
Start: 2020-03-13 | End: 2021-01-04

## 2020-04-27 RX ORDER — DULAGLUTIDE 0.75 MG/.5ML
INJECTION, SOLUTION SUBCUTANEOUS
Qty: 2 ML | Refills: 5 | Status: SHIPPED | OUTPATIENT
Start: 2020-04-27 | End: 2020-07-16

## 2020-05-15 RX ORDER — TRIAMCINOLONE ACETONIDE 1 MG/G
CREAM TOPICAL
Qty: 30 G | Refills: 0 | Status: SHIPPED | OUTPATIENT
Start: 2020-05-15 | End: 2020-07-01

## 2020-05-29 ENCOUNTER — TELEPHONE (OUTPATIENT)
Dept: FAMILY MEDICINE CLINIC | Age: 40
End: 2020-05-29

## 2020-07-01 RX ORDER — TRIAMCINOLONE ACETONIDE 1 MG/G
CREAM TOPICAL
Qty: 30 G | Refills: 0 | Status: SHIPPED | OUTPATIENT
Start: 2020-07-01 | End: 2020-09-06

## 2020-07-16 ENCOUNTER — OFFICE VISIT (OUTPATIENT)
Dept: FAMILY MEDICINE CLINIC | Age: 40
End: 2020-07-16
Payer: COMMERCIAL

## 2020-07-16 VITALS
HEART RATE: 84 BPM | SYSTOLIC BLOOD PRESSURE: 122 MMHG | WEIGHT: 287 LBS | OXYGEN SATURATION: 98 % | BODY MASS INDEX: 42.51 KG/M2 | HEIGHT: 69 IN | DIASTOLIC BLOOD PRESSURE: 76 MMHG | TEMPERATURE: 97.9 F

## 2020-07-16 LAB
CREATININE URINE: 215.4 MG/DL (ref 28–259)
HBA1C MFR BLD: 8 %
MICROALBUMIN UR-MCNC: <1.2 MG/DL
MICROALBUMIN/CREAT UR-RTO: NORMAL MG/G (ref 0–30)

## 2020-07-16 PROCEDURE — 83036 HEMOGLOBIN GLYCOSYLATED A1C: CPT | Performed by: FAMILY MEDICINE

## 2020-07-16 PROCEDURE — 99214 OFFICE O/P EST MOD 30 MIN: CPT | Performed by: FAMILY MEDICINE

## 2020-07-16 PROCEDURE — 3052F HG A1C>EQUAL 8.0%<EQUAL 9.0%: CPT | Performed by: FAMILY MEDICINE

## 2020-07-16 RX ORDER — LIRAGLUTIDE 6 MG/ML
1.8 INJECTION SUBCUTANEOUS DAILY
Qty: 4 PEN | Refills: 3 | Status: SHIPPED | OUTPATIENT
Start: 2020-07-16 | End: 2021-09-10

## 2020-07-16 ASSESSMENT — PATIENT HEALTH QUESTIONNAIRE - PHQ9
1. LITTLE INTEREST OR PLEASURE IN DOING THINGS: 0
SUM OF ALL RESPONSES TO PHQ QUESTIONS 1-9: 0
SUM OF ALL RESPONSES TO PHQ9 QUESTIONS 1 & 2: 0
2. FEELING DOWN, DEPRESSED OR HOPELESS: 0
SUM OF ALL RESPONSES TO PHQ QUESTIONS 1-9: 0

## 2020-07-16 NOTE — PROGRESS NOTES
Yes Fer Lester MD   acyclovir (ZOVIRAX) 800 MG tablet Take 1 tablet by mouth daily Yes Margarita Singh DO   estradiol (ESTRACE VAGINAL) 0.1 MG/GM vaginal cream Place 1 g vaginally daily Yes Margarita Singh DO   levonorgestrel (MIRENA, 52 MG,) IUD 52 mg 1 each by Intrauterine route once Yes Historical Provider, MD   ferrous gluconate (FERGON) 324 (38 Fe) MG tablet TK 1 T TID WITH MEALS Yes Historical Provider, MD   ibuprofen (ADVIL;MOTRIN) 800 MG tablet Take 1 tablet by mouth every 8 hours as needed for Pain Yes Margarita Singh DO   Blood Glucose Monitoring Suppl (ONE TOUCH ULTRA MINI) w/Device KIT  Yes Historical Provider, MD Babar Rothman LANCETS 97P MISC Use to test daily Yes Fer Lester MD   ammonium lactate (LAC-HYDRIN) 12 % lotion Apply topically daily. Patient not taking: Reported on 11/25/2019  Fer Lester MD   medroxyPROGESTERone (PROVERA) 10 MG tablet Take 2 tablets by mouth 3 times daily  Patient not taking: Reported on 11/13/2019  Margarita Singh DO   medroxyPROGESTERone (PROVERA) 10 MG tablet Take 1 tablet by mouth 3 times daily for 10 days  Margarita Singh DO   aspirin 81 MG chewable tablet Take 81 mg by mouth as needed for Pain  Historical Provider, MD        Social History     Tobacco Use    Smoking status: Never Smoker    Smokeless tobacco: Never Used   Substance Use Topics    Alcohol use: No        Vitals:    07/16/20 1046   BP: 122/76   Pulse: 84   Temp: 97.9 °F (36.6 °C)   TempSrc: Oral   SpO2: 98%   Weight: 287 lb (130.2 kg)   Height: 5' 9\" (1.753 m)     Estimated body mass index is 42.38 kg/m² as calculated from the following:    Height as of this encounter: 5' 9\" (1.753 m). Weight as of this encounter: 287 lb (130.2 kg). Physical Exam  Vitals signs and nursing note reviewed. Constitutional:       Appearance: Normal appearance. She is well-developed. HENT:      Head: Normocephalic and atraumatic.       Right Ear: External ear normal.      Left Ear: External ear normal.      Nose: Nose normal.   Eyes:      Conjunctiva/sclera: Conjunctivae normal.      Pupils: Pupils are equal, round, and reactive to light. Neck:      Musculoskeletal: Normal range of motion and neck supple. Cardiovascular:      Rate and Rhythm: Normal rate and regular rhythm. Heart sounds: Normal heart sounds. Pulmonary:      Effort: Pulmonary effort is normal.      Breath sounds: Normal breath sounds. Abdominal:      General: Bowel sounds are normal.      Palpations: Abdomen is soft. Musculoskeletal: Normal range of motion. Skin:     General: Skin is dry. Neurological:      Mental Status: She is alert and oriented to person, place, and time. Deep Tendon Reflexes: Reflexes are normal and symmetric. Psychiatric:         Behavior: Behavior normal.         Thought Content: Thought content normal.         Judgment: Judgment normal.         ASSESSMENT/PLAN:  1. Other specified diabetes mellitus with other specified complication, unspecified whether long term insulin use ([facility]  Lab Results   Component Value Date    LABA1C 8.0 07/16/2020     Lab Results   Component Value Date    .7 01/12/2018     Improving.    - POCT glycosylated hemoglobin (Hb A1C)  - Liraglutide (VICTOZA) 18 MG/3ML SOPN SC injection; Inject 1.8 mg into the skin daily  Dispense: 4 pen; Refill: 3  - Microalbumin / Creatinine Urine Ratio    2. Skin lesions  Patient desires to see dermatology. [facility]      No follow-ups on file. An electronic signature was used to authenticate this note.     --Serena Horan MD on 7/16/2020 at 10:54 AM

## 2020-08-05 RX ORDER — METRONIDAZOLE 7.5 MG/G
GEL TOPICAL
Qty: 45 G | Refills: 0 | Status: SHIPPED | OUTPATIENT
Start: 2020-08-05 | End: 2020-10-02

## 2020-09-06 RX ORDER — TRIAMCINOLONE ACETONIDE 1 MG/G
CREAM TOPICAL
Qty: 30 G | Refills: 0 | Status: SHIPPED | OUTPATIENT
Start: 2020-09-06 | End: 2020-09-08

## 2020-09-08 RX ORDER — TRIAMCINOLONE ACETONIDE 1 MG/G
CREAM TOPICAL
Qty: 30 G | Refills: 0 | Status: SHIPPED | OUTPATIENT
Start: 2020-09-08 | End: 2021-01-04 | Stop reason: SDUPTHER

## 2020-10-02 RX ORDER — METRONIDAZOLE 7.5 MG/G
GEL TOPICAL
Qty: 45 G | Refills: 0 | Status: SHIPPED | OUTPATIENT
Start: 2020-10-02 | End: 2021-09-10

## 2020-10-30 ENCOUNTER — NURSE TRIAGE (OUTPATIENT)
Dept: OTHER | Facility: CLINIC | Age: 40
End: 2020-10-30

## 2020-10-30 ENCOUNTER — VIRTUAL VISIT (OUTPATIENT)
Dept: FAMILY MEDICINE CLINIC | Age: 40
End: 2020-10-30
Payer: COMMERCIAL

## 2020-10-30 ENCOUNTER — TELEPHONE (OUTPATIENT)
Dept: FAMILY MEDICINE CLINIC | Age: 40
End: 2020-10-30

## 2020-10-30 PROCEDURE — 99214 OFFICE O/P EST MOD 30 MIN: CPT | Performed by: FAMILY MEDICINE

## 2020-10-30 RX ORDER — BUTALBITAL, ACETAMINOPHEN AND CAFFEINE 50; 325; 40 MG/1; MG/1; MG/1
1 TABLET ORAL EVERY 4 HOURS PRN
Qty: 180 TABLET | Refills: 3 | Status: SHIPPED | OUTPATIENT
Start: 2020-10-30 | End: 2021-02-08

## 2020-10-30 RX ORDER — CYCLOBENZAPRINE HCL 10 MG
10 TABLET ORAL 3 TIMES DAILY PRN
Qty: 30 TABLET | Refills: 0 | Status: SHIPPED | OUTPATIENT
Start: 2020-10-30 | End: 2021-06-24 | Stop reason: SDUPTHER

## 2020-10-30 NOTE — TELEPHONE ENCOUNTER
----- Message from Tahir Miramontes sent at 10/30/2020  9:22 AM EDT -----  Subject: Appointment Request    Reason for Call: Immediate Return from RN Triage    QUESTIONS  Type of Appointment? Established Patient  Reason for appointment request? No appointments available during search  Additional Information for Provider? Patient has been having headaches for   the past 3 days. This happened last year due to a blood clot. Patient   would like a call back within an hour. She has screened green for Covid. Dean Patricio ---------------------------------------------------------------------------  --------------  [employer]  What is the best way for the office to contact you? OK to leave message on   voicemail  Preferred Call Back Phone Number? [phone]  ---------------------------------------------------------------------------  --------------  SCRIPT ANSWERS  Patient needs to be seen today? Yes  Patient needs to be seen today or tomorrow? Yes  Patient needs to be seen within 5 days? Yes  Patient can be seen for a routine visit? Yes  Nurse Name? Guadalupe Zepeda  (Did RN indicate the need for Red Scheduling?)? No  Have you been diagnosed with COVID-19 (Coronavirus)   tested for COVID-19 (Coronavirus)   or told that you are suspected of having COVID-19 (Coronavirus)? No  Have you had a fever or taken medication to treat a fever within the past   3 days? No  Have you had a cough   shortness of breath or flu-like symptoms within the past 3 days? No  Do you currently have flu-like symptoms including fever or chills   cough   shortness of breath   or difficulty breathing   or new loss of taste or smell? No  (Service Expert  click yes below to proceed with [employer] As Usual   Scheduling)?  Yes

## 2020-10-30 NOTE — PROGRESS NOTES
10/30/2020    TELEHEALTH EVALUATION -- Audio/Visual (During [facility]-77 public health emergency)    HPI:    Lizeth Pepe (:  1980) has requested an audio/video evaluation for the following concern(s):    Headache: Frontal, for 3 days. Tried sudafed cold and sinus. Patient notes shoulders and back of head hurt. Patient denies cognitive dysfunction. Been taking meds for diabetes and htn. Patient went to urgent care, bp was running high. Tried ibuprofen. Has tried excedrin migraine not helpful. Dulled pain a little. Neck and shoulder hurt. NO fevers no chills no cough. No more stress than usual.      Review of Systems    Prior to Visit Medications    Medication Sig Taking?  Authorizing Provider   metroNIDAZOLE (METROGEL) 0.75 % gel APPLY EXTERNALLY TO THE AFFECTED AREA TWICE DAILY Yes Erik Yao MD   triamcinolone (KENALOG) 0.1 % cream APPLY EXTERNALLY TO THE AFFECTED AREA TWICE DAILY Yes Lindsay Blum MD   Liraglutide (VICTOZA) 18 MG/3ML SOPN SC injection Inject 1.8 mg into the skin daily Yes Erik Yao MD   metFORMIN (GLUCOPHAGE) 1000 MG tablet TAKE 1 TABLET BY MOUTH TWICE DAILY WITH MEALS Yes Erik Yao MD   pioglitazone (ACTOS) 15 MG tablet TAKE 1 TABLET BY MOUTH DAILY Yes Erik Yao MD   venlafaxine (EFFEXOR XR) 75 MG extended release capsule TAKE 1 CAPSULE BY MOUTH DAILY Yes Erik Yao MD   ONE TOUCH ULTRA TEST strip USE TO TEST DAILY Yes Erik Yao MD   spironolactone (ALDACTONE) 25 MG tablet TAKE 1 TABLET BY MOUTH DAILY Yes Erik Yao MD   acyclovir (ZOVIRAX) 800 MG tablet Take 1 tablet by mouth daily Yes Marbella Cummings DO   estradiol (ESTRACE VAGINAL) 0.1 MG/GM vaginal cream Place 1 g vaginally daily Yes Marbella Cummings DO   levonorgestrel (MIRENA, 52 MG,) IUD 52 mg 1 each by Intrauterine route once Yes Historical Provider, MD   ferrous gluconate (FERGON) 324 (38 Fe) MG tablet TK 1 T TID WITH MEALS Yes Historical Provider, MD   medroxyPROGESTERone (PROVERA) 10 MG tablet Take 2 tablets by mouth 3 times daily Yes Jluis Miller DO   aspirin 81 MG chewable tablet Take 81 mg by mouth as needed for Pain Yes Historical Provider, MD   ibuprofen (ADVIL;MOTRIN) 800 MG tablet Take 1 tablet by mouth every 8 hours as needed for Pain Yes Jluis Miller DO   Blood Glucose Monitoring Suppl (ONE TOUCH ULTRA MINI) w/Device KIT  Yes Historical Provider, MD Regina Kaba LANCETS 01U MISC Use to test daily Yes Zoë Rod MD   ammonium lactate (LAC-HYDRIN) 12 % lotion Apply topically daily.   Patient not taking: Reported on 11/25/2019  Zoë Rod MD   medroxyPROGESTERone (PROVERA) 10 MG tablet Take 1 tablet by mouth 3 times daily for 10 days  Jluis Miller DO       Social History     Tobacco Use    Smoking status: Never Smoker    Smokeless tobacco: Never Used   Substance Use Topics    Alcohol use: No    Drug use: No        Allergies   Allergen Reactions    Codeine      Hives/Rash    Prochlorperazine Edisylate Other (See Comments)     Muscle spasms neck    Sulfa Antibiotics Other (See Comments)   ,   Past Medical History:   Diagnosis Date    Class 3 severe obesity due to excess calories with body mass index (BMI) of 40.0 to 44.9 in adult [facility]) 11/16/2018    Hx of blood clots     \"clot in head caused seizure\"    New onset seizure ([facility] 9/16/2018    Type 2 diabetes mellitus without complication ([facility] 0/68/8048   ,   Past Surgical History:   Procedure Laterality Date    DILATION AND CURETTAGE OF UTERUS N/A 12/19/2018    VIDEO HYSTEROSCOPY DILATATION AND CURETTAGE AND POSSIBLE Blossom Kelch performed by Jluis Miller DO at [address] [facility]     ,   Social History     Tobacco Use    Smoking status: Never Smoker    Smokeless tobacco: Never Used   Substance Use Topics    Alcohol use: No    Drug use: No   ,   Family History   Problem Relation Age of Onset    High Blood Pressure Father     No Known Problems Mother    [facility] Cancer Maternal Uncle         lung    Alcohol Abuse Paternal Grandfather     No Known Problems Paternal Grandmother     No Known Problems Maternal Grandmother     No Known Problems Maternal Grandfather     Heart Failure Sister     No Known Problems Paternal Aunt     No Known Problems Maternal Uncle     No Known Problems Maternal Uncle     COPD Maternal Aunt     Other Paternal Aunt         suicide       PHYSICAL EXAMINATION:       Constitutional: [x] Appears well-developed and well-nourished [x] No apparent distress      [] Abnormal-   Mental status  [x] Alert and awake  [x] Oriented to person/place/time []Able to follow commands      Eyes:  EOM    [x]  Normal  [] Abnormal-  Sclera  [x]  Normal  [] Abnormal -         Discharge [x]  None visible  [] Abnormal -    HENT:   [x] Normocephalic, atraumatic. [] Abnormal   [x] Mouth/Throat: Mucous membranes are moist.     External Ears [x] Normal  [] Abnormal-     Neck: [x] No visualized mass     Pulmonary/Chest: [x] Respiratory effort normal.  [] No visualized signs of difficulty breathing or respiratory distress        [] Abnormal-      Musculoskeletal:   [x] Normal gait with no signs of ataxia         [x] Normal range of motion of neck        [] Abnormal-       Neurological:        [x] No Facial Asymmetry (Cranial nerve 7 motor function) (limited exam to video visit)          [x] No gaze palsy        [] Abnormal-         Skin:        [x] No significant exanthematous lesions or discoloration noted on facial skin         [] Abnormal-            Psychiatric:       [x] Normal Affect [x] No Hallucinations        [] Abnormal-     Other pertinent observable physical exam findings-     ASSESSMENT/PLAN:  1. Tension headache    - cyclobenzaprine (FLEXERIL) 10 MG tablet; Take 1 tablet by mouth 3 times daily as needed for Muscle spasms  Dispense: 30 tablet; Refill: 0  - butalbital-acetaminophen-caffeine (FIORICET, ESGIC) -40 MG per tablet;  Take 1 tablet by mouth every 4 hours as needed for Headaches  Dispense: 180 tablet; Refill: 3      No follow-ups on file. Lowell Velasco is a 45 y.o. female being evaluated by a Virtual Visit (video visit) encounter to address concerns as mentioned above. A caregiver was present when appropriate. Due to this being a TeleHealth encounter (During [facility]-78 public health emergency), evaluation of the following organ systems was limited: Vitals/Constitutional/EENT/Resp/CV/GI//MS/Neuro/Skin/Heme-Lymph-Imm. Pursuant to the emergency declaration under the [address] [place] [address], [address] [place] [address] authority and the [employer] Act, this Virtual Visit was conducted with patient's (and/or legal guardian's) consent, to reduce the patient's risk of exposure to COVID-19 and provide necessary medical care. The patient (and/or legal guardian) has also been advised to contact this office for worsening conditions or problems, and seek emergency medical treatment and/or call 911 if deemed necessary. Patient identification was verified at the start of the visit: Yes    Total time spent on this encounter: Not billed by time    Services were provided through a video synchronous discussion virtually to substitute for in-person clinic visit. Patient and provider were located at their individual homes. --Luis Patel MD on 10/30/2020 at 10:02 AM    An electronic signature was used to authenticate this note.

## 2020-10-30 NOTE — TELEPHONE ENCOUNTER
Reason for Disposition   SEVERE headache (e.g., excruciating) and has had severe headaches before    Answer Assessment - Initial Assessment Questions  1. LOCATION: \"Where does it hurt? \"       Pain is on the back of the head  2. ONSET: \"When did the headache start? \" (Minutes, hours or days)       Onset was 3 days ago  3. PATTERN: \"Does the pain come and go, or has it been constant since it started? \"      Constant  4. SEVERITY: \"How bad is the pain? \" and \"What does it keep you from doing? \"  (e.g., Scale 1-10; mild, moderate, or severe)    - MILD (1-3): doesn't interfere with normal activities     - MODERATE (4-7): interferes with normal activities or awakens from sleep     - SEVERE (8-10): excruciating pain, unable to do any normal activities         Pain at its worst (10/10); Current pain level (  5. RECURRENT SYMPTOM: \"Have you ever had headaches before? \" If so, ask: \"When was the last time? \" and \"What happened that time? \"       Yes - las year it was a blood clot in hr brain  6. CAUSE: \"What do you think is causing the headache? \"      unknown  7. MIGRAINE: \"Have you been diagnosed with migraine headaches? \" If so, ask: \"Is this headache similar? \"       When she was younger a doctor diagnosed her with migrains  8. HEAD INJURY: \"Has there been any recent injury to the head? \"       No  9. OTHER SYMPTOMS: \"Do you have any other symptoms? \" (fever, stiff neck, eye pain, sore throat, cold symptoms)      No   10. PREGNANCY: \"Is there any chance you are pregnant? \" \"When was your last menstrual period? \"        No    Protocols used: HEADACHE-ADULT-[place]    Received call from [address]. Call soft transferred to [address] to schedule appointment. Attention Provider: Thank you for allowing me to participate in the care of your patient. The  patient was connected to triage in response to information provided to the [facility].  Please do not respond through this encounter as the response is not directed to a shared pool. Patient reports a severe headache for the past 3 days. Current pain level is 7/10 but pain can reach a 10/10. Patient reports that she has a history of a blood clot in her brain and she likens this headache to the one that preceded the blood clot diagnosis. She is taking Ibuprofen. Recommended she speak with her provider in the next hours. Care advice provided. Warm transfer to [facility] at the service center for physician contact/appointment scheduling.

## 2020-10-30 NOTE — TELEPHONE ENCOUNTER
Pt reports H/A as below. Denies any loss of , vision changes, slurring of speech, confusion. Alert and oriented on phone. Concerned given past hx. \  PCP consulted and VV appt scheduled. Future Appointments   Date Time Provider Benny Rodriguez   10/30/2020 10:15 AM Hardik Figueredo MD [facility]     Pt advised to allow access to mahogany and camera. Advised to look for text link to VV. Patient voices understanding.         BPA re COVID - did not see COVID test results in LAB

## 2020-11-09 ENCOUNTER — PATIENT MESSAGE (OUTPATIENT)
Dept: FAMILY MEDICINE CLINIC | Age: 40
End: 2020-11-09

## 2020-11-10 ENCOUNTER — HOSPITAL ENCOUNTER (OUTPATIENT)
Dept: GENERAL RADIOLOGY | Age: 40
Discharge: HOME | End: 2020-11-10
Payer: COMMERCIAL

## 2020-11-10 PROCEDURE — 73562 X-RAY EXAM OF KNEE 3: CPT

## 2020-11-10 NOTE — TELEPHONE ENCOUNTER
From: Lizeth Pepe  To: Erik Yao MD  Sent: 11/9/2020 6:51 PM EST  Subject: Non-Urgent Medical Question    On Thursday I fell busted up my face hand and knee. Went to urgent care got a couple stitches in nose . Everything seems to be healing good except knee. Which at time wasnt a big concern didnt seem to hurt as bad as face. But knee seems to not be healing as well as everything else. Still very painful when touched at all . Was wondering if could refer me for x ray and if needed to set up a virtual visit 1st Thank you Just going to emergency room would be outrageously expensive.

## 2020-11-30 RX ORDER — PIOGLITAZONEHYDROCHLORIDE 15 MG/1
15 TABLET ORAL DAILY
Qty: 30 TABLET | Refills: 3 | Status: SHIPPED | OUTPATIENT
Start: 2020-11-30 | End: 2022-04-15

## 2020-11-30 NOTE — TELEPHONE ENCOUNTER
Gurvinder barrera a refill for patient on Pioglitazone 15mg tab      Last visit 10/30/20  No future  ning smith

## 2021-01-01 DIAGNOSIS — A60.9 HSV (HERPES SIMPLEX VIRUS) ANOGENITAL INFECTION: ICD-10-CM

## 2021-01-01 DIAGNOSIS — N92.6 MENSTRUAL IRREGULARITY: ICD-10-CM

## 2021-01-01 DIAGNOSIS — F41.9 ANXIETY AND DEPRESSION: ICD-10-CM

## 2021-01-01 DIAGNOSIS — F32.A ANXIETY AND DEPRESSION: ICD-10-CM

## 2021-01-04 ENCOUNTER — TELEPHONE (OUTPATIENT)
Dept: FAMILY MEDICINE CLINIC | Age: 41
End: 2021-01-04

## 2021-01-04 RX ORDER — VENLAFAXINE HYDROCHLORIDE 75 MG/1
75 CAPSULE, EXTENDED RELEASE ORAL DAILY
Qty: 30 CAPSULE | Refills: 5 | Status: SHIPPED | OUTPATIENT
Start: 2021-01-04 | End: 2021-08-31

## 2021-01-04 RX ORDER — SPIRONOLACTONE 25 MG/1
25 TABLET ORAL DAILY
Qty: 30 TABLET | Refills: 5 | Status: SHIPPED | OUTPATIENT
Start: 2021-01-04 | End: 2022-04-15

## 2021-01-04 RX ORDER — ACYCLOVIR 800 MG/1
800 TABLET ORAL DAILY
Qty: 30 TABLET | Refills: 11 | OUTPATIENT
Start: 2021-01-04

## 2021-01-04 RX ORDER — TRIAMCINOLONE ACETONIDE 1 MG/G
CREAM TOPICAL
Qty: 30 G | Refills: 0 | Status: SHIPPED | OUTPATIENT
Start: 2021-01-04 | End: 2022-10-25

## 2021-01-18 ENCOUNTER — OFFICE VISIT (OUTPATIENT)
Dept: OBGYN CLINIC | Age: 41
End: 2021-01-18
Payer: COMMERCIAL

## 2021-01-18 VITALS
SYSTOLIC BLOOD PRESSURE: 126 MMHG | DIASTOLIC BLOOD PRESSURE: 84 MMHG | WEIGHT: 282.13 LBS | TEMPERATURE: 97 F | HEART RATE: 103 BPM | BODY MASS INDEX: 41.66 KG/M2

## 2021-01-18 DIAGNOSIS — Z86.718 HX OF CEREBRAL VENOUS SINUS THROMBOSIS: ICD-10-CM

## 2021-01-18 DIAGNOSIS — E66.01 CLASS 3 SEVERE OBESITY DUE TO EXCESS CALORIES WITHOUT SERIOUS COMORBIDITY WITH BODY MASS INDEX (BMI) OF 40.0 TO 44.9 IN ADULT (HCC): ICD-10-CM

## 2021-01-18 DIAGNOSIS — Z01.419 WOMEN'S ANNUAL ROUTINE GYNECOLOGICAL EXAMINATION: Primary | ICD-10-CM

## 2021-01-18 DIAGNOSIS — N76.0 RECURRENT VAGINITIS: ICD-10-CM

## 2021-01-18 DIAGNOSIS — Z12.4 PAP SMEAR FOR CERVICAL CANCER SCREENING: ICD-10-CM

## 2021-01-18 DIAGNOSIS — Z12.39 SCREENING BREAST EXAMINATION: ICD-10-CM

## 2021-01-18 DIAGNOSIS — Z86.19 HX OF HERPES GENITALIS: ICD-10-CM

## 2021-01-18 DIAGNOSIS — Z20.2 POSSIBLE EXPOSURE TO STD: ICD-10-CM

## 2021-01-18 DIAGNOSIS — D68.51 HETEROZYGOUS FACTOR V LEIDEN MUTATION (HCC): ICD-10-CM

## 2021-01-18 DIAGNOSIS — E11.9 TYPE 2 DIABETES MELLITUS WITHOUT COMPLICATION, WITHOUT LONG-TERM CURRENT USE OF INSULIN (HCC): ICD-10-CM

## 2021-01-18 DIAGNOSIS — Z30.431 ENCOUNTER FOR ROUTINE CHECKING OF INTRAUTERINE CONTRACEPTIVE DEVICE (IUD): ICD-10-CM

## 2021-01-18 PROCEDURE — G8484 FLU IMMUNIZE NO ADMIN: HCPCS | Performed by: OBSTETRICS & GYNECOLOGY

## 2021-01-18 PROCEDURE — 99395 PREV VISIT EST AGE 18-39: CPT | Performed by: OBSTETRICS & GYNECOLOGY

## 2021-01-18 ASSESSMENT — ENCOUNTER SYMPTOMS
SORE THROAT: 0
ABDOMINAL PAIN: 0
BACK PAIN: 0
DIARRHEA: 0
VOMITING: 0
WHEEZING: 0
CHEST TIGHTNESS: 0
NAUSEA: 0
ROS SKIN COMMENTS: ABNORMAL HAIR GROWTH
SHORTNESS OF BREATH: 0
COLOR CHANGE: 0

## 2021-01-18 NOTE — PROGRESS NOTES
[facility] Ob/[facility]yn  Annual Exam    CC:   Chief Complaint   Patient presents with    Gynecologic Exam       HPI: Matthew Billings is a 45 y.o. female who presents for annual exam.     Pt is doing well today without complaints. No changes to medical history. Other medical problems at baseline. She was last seen in office on 11/25/19 for recheck due to recurrent vaginitis and HSV outbreak. Denies any other issues since that visit. Pt has had a Hysteroscopy D and C on 12/19/19 due to endometrial polyps. Has had a hx of cerebral venous thrombosis due to Diego use. She is heterozygous for factor V leiden. Health Maintenance:  Safe Enviroment: Y  Healthy diet: diabetic diet  Exercise: y occasional   Sexually Active: y (male partner)   denies sexual problems  Contraception: Mirena IUD (placed 4/9/19)    Screening:  Last pap smear: PAP 12/1/2017 NILM/HPV (-)  History of abnormal pap smears: Denies  STI History: Y -- HSV    1st outbreak 1/10/19      Vaccines:  Gardasil vaccine: N  Flu vaccine: N     Cerebral Vein Thrombosis (9/4/18) leading to stoke/seizure from [place]IE[place]. on Xarelto continuously. Review of Systems:   Review of Systems   Constitutional: Negative for appetite change, chills and fever. HENT: Negative for congestion, sneezing and sore throat. Eyes: Negative for visual disturbance. Respiratory: Negative for chest tightness, shortness of breath and wheezing. Cardiovascular: Negative for chest pain, palpitations and leg swelling. Gastrointestinal: Negative for abdominal pain, diarrhea, nausea and vomiting. Endocrine: Negative for heat intolerance. Genitourinary: Positive for vaginal discharge. Negative for difficulty urinating, dyspareunia, dysuria, menstrual problem (doing well with IUD), pelvic pain and vaginal bleeding. Musculoskeletal: Positive for arthralgias. Negative for back pain, neck pain and neck stiffness. Skin: Negative for color change, pallor and rash. Abnormal hair growth   Allergic/Immunologic: Negative for environmental allergies, food allergies and immunocompromised state. Neurological: Positive for headaches. Negative for seizures, light-headedness and numbness. Hematological: Does not bruise/bleed easily. Psychiatric/Behavioral: Negative for behavioral problems, sleep disturbance and suicidal ideas. Gynecologic History  Menstrual History:  ? LMP: Patient's last menstrual period was 2020 (approximate). ? Menstrual Period: irregular  ? Interval Between Menses: unk  ? Duration of Menses: unk  ? Menstrual Flow: occasional spotting  ? Bleeding between menses: occasional   ? Since IUD placement     Obstetrical History:  OB History        0    Para   0    Term   0       0    AB   0    Living   0       SAB   0    TAB   0    Ectopic   0    Molar   0    Multiple   0    Live Births   0              Past Medical History:   Past Medical History:   Diagnosis Date    Class 3 severe obesity due to excess calories with body mass index (BMI) of 40.0 to 44.9 in adult [facility]) 2018    Hx of blood clots     \"clot in head caused seizure\"    New onset seizure ([facility] 2018    Type 2 diabetes mellitus without complication ([facility]        Medications:  Current Outpatient Medications   Medication Sig Dispense Refill    spironolactone (ALDACTONE) 25 MG tablet TAKE 1 TABLET BY MOUTH DAILY 30 tablet 5    venlafaxine (EFFEXOR XR) 75 MG extended release capsule TAKE 1 CAPSULE BY MOUTH DAILY 30 capsule 5    triamcinolone (KENALOG) 0.1 % cream Apply topically 2 times daily.  30 g 0    metroNIDAZOLE (METROGEL) 0.75 % gel APPLY EXTERNALLY TO THE AFFECTED AREA TWICE DAILY 45 g 0    Liraglutide (VICTOZA) 18 MG/3ML SOPN SC injection Inject 1.8 mg into the skin daily 4 pen 3    acyclovir (ZOVIRAX) 800 MG tablet Take 1 tablet by mouth daily 30 tablet 11  estradiol (ESTRACE VAGINAL) 0.1 MG/GM vaginal cream Place 1 g vaginally daily 1 Tube 3    pioglitazone (ACTOS) 15 MG tablet Take 1 tablet by mouth daily 30 tablet 3    butalbital-acetaminophen-caffeine (FIORICET, ESGIC) -40 MG per tablet Take 1 tablet by mouth every 4 hours as needed for Headaches (Patient not taking: Reported on 1/18/2021) 180 tablet 3    metFORMIN (GLUCOPHAGE) 1000 MG tablet TAKE 1 TABLET BY MOUTH TWICE DAILY WITH MEALS 60 tablet 5    ONE TOUCH ULTRA TEST strip USE TO TEST DAILY 100 strip 5    levonorgestrel (MIRENA, 52 MG,) IUD 52 mg 1 each by Intrauterine route once      ammonium lactate (LAC-HYDRIN) 12 % lotion Apply topically daily.  (Patient not taking: Reported on 11/25/2019) 225 g 0    ferrous gluconate (FERGON) 324 (38 Fe) MG tablet TK 1 T TID WITH MEALS  3    medroxyPROGESTERone (PROVERA) 10 MG tablet Take 2 tablets by mouth 3 times daily (Patient not taking: Reported on 1/18/2021) 90 tablet 3    medroxyPROGESTERone (PROVERA) 10 MG tablet Take 1 tablet by mouth 3 times daily for 10 days 30 tablet 0    aspirin 81 MG chewable tablet Take 81 mg by mouth as needed for Pain      ibuprofen (ADVIL;MOTRIN) 800 MG tablet Take 1 tablet by mouth every 8 hours as needed for Pain 30 tablet 1    Blood Glucose Monitoring Suppl (ONE TOUCH ULTRA MINI) w/Device KIT       ONETOUCH DELICA LANCETS 15Z MISC Use to test daily 100 each 1     Current Facility-Administered Medications   Medication Dose Route Frequency Provider Last Rate Last Admin    levonorgestrel (MIRENA) IUD 52 mg 1 each  1 each Intrauterine Once James Murrell DO   1 each at 04/09/19 1028       Allergies:  Codeine, Prochlorperazine edisylate, and Sulfa antibiotics    Surgical History:  Past Surgical History:   Procedure Laterality Date    DILATION AND CURETTAGE OF UTERUS N/A 12/19/2018    VIDEO HYSTEROSCOPY DILATATION AND CURETTAGE AND POSSIBLE MYOSURE performed by James Murrell DO at [facility] Neuro: Alert, oriented, normal speech, no focal deficits, moves extremities appropriately  Osteopathic: No TART changes    Labs:  Office Visit on 01/18/2021   Component Date Value    C. trachomatis DNA 01/18/2021 Negative     N. gonorrhoeae DNA 01/18/2021 Negative     Trichomonas Vaginalis DNA 01/18/2021                      Value:Negative DNA not detected. Normal range: Negative DNA not detected.  GARDNERELLA VAGINALIS, D* 01/18/2021                      Value:Negative DNA not detected. Normal range: Negative DNA not detected.  RAQUEL SPECIES, DNA PRO* 01/18/2021                      Value:Negative DNA not detected. Normal range: Negative DNA not detected.  HPV TYPE 16 01/18/2021 Not Detected     HPV TYPE 18 01/18/2021 Not Detected     HPVOH (OTHER TYPES) 01/18/2021 Not Detected     HPV Comment 01/18/2021 See below      Assessment/Plan:   45 y.o.  female presents for annual exam:     Diagnosis Orders   1. Women's annual routine gynecological examination     2. Pap smear for cervical cancer screening  PAP SMEAR   3. Possible exposure to STD  C.trachomatis N.gonorrhoeae DNA    VAGINAL PATHOGENS PROBE *A   4. Screening breast examination     5. Encounter for routine checking of intrauterine contraceptive device (IUD)     6. Recurrent vaginitis     7. Hx of herpes genitalis     8. Hx of cerebral venous sinus thrombosis     9. Class 3 severe obesity due to excess calories without serious comorbidity with body mass index (BMI) of 40.0 to 44.9 in adult ([facility]     10. Heterozygous factor V Leiden mutation ([facility]     11. Type 2 diabetes mellitus without complication, without long-term current use of insulin ([facility]       - doing well  - PAP / swabs pending  - IUD in place doing well, needs removed/replaced 4/2024   - boric acid as needed (already sent to pharmacy)      Annual Visit Recommendations:   Clinical breast exam was done and self-breast exam and self-breast awareness reviewed. Pelvic exam was done and ASCCP guidelines reviewed   Reviewed healthy diet and daily multivitamin use. Reviewed recommendations on Calcium and Vitamin D intake. Discussed seatbelt use. Follow Up  Will call with results   Return in about 1 year (around 1/18/2022) for Annual or sooner if needed.     Jean Andrews,

## 2021-01-19 LAB
CANDIDA SPECIES, DNA PROBE: NORMAL
GARDNERELLA VAGINALIS, DNA PROBE: NORMAL
TRICHOMONAS VAGINALIS DNA: NORMAL

## 2021-01-20 LAB
C TRACH DNA GENITAL QL NAA+PROBE: NEGATIVE
HPV COMMENT: NORMAL
HPV TYPE 16: NOT DETECTED
HPV TYPE 18: NOT DETECTED
HPVOH (OTHER TYPES): NOT DETECTED
N. GONORRHOEAE DNA: NEGATIVE

## 2021-01-28 ENCOUNTER — HOSPITAL ENCOUNTER (OUTPATIENT)
Dept: GENERAL RADIOLOGY | Age: 41
Discharge: HOME | End: 2021-01-28
Payer: COMMERCIAL

## 2021-01-28 DIAGNOSIS — M54.50 ACUTE RIGHT-SIDED LOW BACK PAIN WITHOUT SCIATICA: ICD-10-CM

## 2021-01-28 DIAGNOSIS — M54.9 DORSALGIA: ICD-10-CM

## 2021-01-28 PROCEDURE — 72100 X-RAY EXAM L-S SPINE 2/3 VWS: CPT

## 2021-01-28 PROCEDURE — 72072 X-RAY EXAM THORAC SPINE 3VWS: CPT

## 2021-02-04 DIAGNOSIS — G89.18 POST-OPERATIVE PAIN: ICD-10-CM

## 2021-02-04 DIAGNOSIS — A60.9 HSV (HERPES SIMPLEX VIRUS) ANOGENITAL INFECTION: ICD-10-CM

## 2021-02-04 RX ORDER — ACYCLOVIR 800 MG/1
800 TABLET ORAL DAILY
Qty: 30 TABLET | Refills: 11 | Status: SHIPPED | OUTPATIENT
Start: 2021-02-04 | End: 2022-02-25

## 2021-02-04 RX ORDER — ACYCLOVIR 400 MG/1
TABLET ORAL
Qty: 30 TABLET | Refills: 0 | OUTPATIENT
Start: 2021-02-04

## 2021-02-04 NOTE — TELEPHONE ENCOUNTER
Placed call to patient, patient stated she attempted to get refill a while back and stated she had to come back for her annual. Stated she has completed this (her annual) and would like to return to her daily suppressive therapy. Pended medication and verified pharmacy, there are 2 medications pended from previous note. She DOES want a DAILY dose. Sorry for any confusion. Please advise.      Routing to Dr. Raz Singleton

## 2021-02-05 NOTE — TELEPHONE ENCOUNTER
[phone] ([place])     Placed call to patient, no answer, left detailed VM and number to return call to office with any questions or concerns.

## 2021-02-08 ENCOUNTER — OFFICE VISIT (OUTPATIENT)
Dept: FAMILY MEDICINE CLINIC | Age: 41
End: 2021-02-08
Payer: COMMERCIAL

## 2021-02-08 VITALS
BODY MASS INDEX: 42.53 KG/M2 | SYSTOLIC BLOOD PRESSURE: 126 MMHG | TEMPERATURE: 97.5 F | WEIGHT: 288 LBS | DIASTOLIC BLOOD PRESSURE: 80 MMHG | HEART RATE: 89 BPM | OXYGEN SATURATION: 99 %

## 2021-02-08 DIAGNOSIS — E11.9 TYPE 2 DIABETES MELLITUS WITHOUT COMPLICATION, WITHOUT LONG-TERM CURRENT USE OF INSULIN (HCC): Primary | ICD-10-CM

## 2021-02-08 DIAGNOSIS — M54.50 ACUTE LOW BACK PAIN, UNSPECIFIED BACK PAIN LATERALITY, UNSPECIFIED WHETHER SCIATICA PRESENT: ICD-10-CM

## 2021-02-08 DIAGNOSIS — J30.2 SEASONAL ALLERGIC RHINITIS, UNSPECIFIED TRIGGER: ICD-10-CM

## 2021-02-08 DIAGNOSIS — E66.01 MORBID OBESITY (HCC): ICD-10-CM

## 2021-02-08 LAB — HBA1C MFR BLD: 11.4 %

## 2021-02-08 PROCEDURE — G8417 CALC BMI ABV UP PARAM F/U: HCPCS | Performed by: FAMILY MEDICINE

## 2021-02-08 PROCEDURE — 2022F DILAT RTA XM EVC RTNOPTHY: CPT | Performed by: FAMILY MEDICINE

## 2021-02-08 PROCEDURE — G8484 FLU IMMUNIZE NO ADMIN: HCPCS | Performed by: FAMILY MEDICINE

## 2021-02-08 PROCEDURE — 1036F TOBACCO NON-USER: CPT | Performed by: FAMILY MEDICINE

## 2021-02-08 PROCEDURE — 3046F HEMOGLOBIN A1C LEVEL >9.0%: CPT | Performed by: FAMILY MEDICINE

## 2021-02-08 PROCEDURE — G8427 DOCREV CUR MEDS BY ELIG CLIN: HCPCS | Performed by: FAMILY MEDICINE

## 2021-02-08 PROCEDURE — 99214 OFFICE O/P EST MOD 30 MIN: CPT | Performed by: FAMILY MEDICINE

## 2021-02-08 PROCEDURE — 83036 HEMOGLOBIN GLYCOSYLATED A1C: CPT | Performed by: FAMILY MEDICINE

## 2021-02-08 RX ORDER — FLUTICASONE PROPIONATE 50 MCG
2 SPRAY, SUSPENSION (ML) NASAL DAILY
Qty: 1 BOTTLE | Refills: 5 | Status: SHIPPED | OUTPATIENT
Start: 2021-02-08

## 2021-02-08 ASSESSMENT — PATIENT HEALTH QUESTIONNAIRE - PHQ9
1. LITTLE INTEREST OR PLEASURE IN DOING THINGS: 0
SUM OF ALL RESPONSES TO PHQ QUESTIONS 1-9: 0
SUM OF ALL RESPONSES TO PHQ QUESTIONS 1-9: 0

## 2021-02-08 ASSESSMENT — ENCOUNTER SYMPTOMS: BACK PAIN: 1
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
## 2021-02-11 NOTE — TELEPHONE ENCOUNTER
[phone] (home)     Placed call to patient, no answer, left detailed message and number to return with any questions or concerns.

## 2021-02-22 ENCOUNTER — TELEPHONE (OUTPATIENT)
Dept: BARIATRICS/WEIGHT MGMT | Age: 41
End: 2021-02-22

## 2021-02-22 NOTE — TELEPHONE ENCOUNTER
Patient was sent Dr. Earle Lara digital bariatric seminar    She DOES NOT have BWLS coverage with [facility] (Plan Exclusion)     LVM for pt to call office back.

## 2021-04-12 ENCOUNTER — VIRTUAL VISIT (OUTPATIENT)
Dept: FAMILY MEDICINE CLINIC | Age: 41
End: 2021-04-12
Payer: COMMERCIAL

## 2021-04-12 DIAGNOSIS — H92.02 OTALGIA OF LEFT EAR: Primary | ICD-10-CM

## 2021-04-12 DIAGNOSIS — R05.9 COUGH: ICD-10-CM

## 2021-04-12 PROCEDURE — 99213 OFFICE O/P EST LOW 20 MIN: CPT | Performed by: NURSE PRACTITIONER

## 2021-04-12 PROCEDURE — G8427 DOCREV CUR MEDS BY ELIG CLIN: HCPCS | Performed by: NURSE PRACTITIONER

## 2021-04-12 RX ORDER — BENZONATATE 100 MG/1
100 CAPSULE ORAL 3 TIMES DAILY PRN
Qty: 30 CAPSULE | Refills: 0 | Status: SHIPPED | OUTPATIENT
Start: 2021-04-12 | End: 2021-04-22

## 2021-04-12 RX ORDER — AZITHROMYCIN 250 MG/1
250 TABLET, FILM COATED ORAL SEE ADMIN INSTRUCTIONS
Qty: 6 TABLET | Refills: 0 | Status: SHIPPED | OUTPATIENT
Start: 2021-04-12 | End: 2021-04-17

## 2021-04-12 ASSESSMENT — ENCOUNTER SYMPTOMS
COUGH: 1
SORE THROAT: 1

## 2021-04-12 NOTE — PROGRESS NOTES
benzonatate (TESSALON) 100 MG capsule; Take 1 capsule by mouth 3 times daily as needed for Cough  Dispense: 30 capsule; Refill: 0      Miguel A Pepe, was evaluated through a synchronous (real-time) audio-video encounter. The patient (or guardian if applicable) is aware that this is a billable service. Verbal consent to proceed has been obtained within the past 12 months. The visit was conducted pursuant to the emergency declaration under the [facility], [address] [place] [address] authority and the [employer] Act. Patient identification was verified, and a caregiver was present when appropriate. The patient was located in a state where the provider was credentialed to provide care. Total time spent for this encounter: 20 min    --RADHA Porter CNP on 4/12/2021 at 11:55 AM    An electronic signature was used to authenticate this note. CHEL Owens    Reviewed treatment plan with patient. Patient verbalized understanding to treatment plan and questions were answered. [address] [facility] [address]  [place], [address]

## 2021-06-23 ENCOUNTER — PATIENT MESSAGE (OUTPATIENT)
Dept: FAMILY MEDICINE CLINIC | Age: 41
End: 2021-06-23

## 2021-06-23 DIAGNOSIS — G44.209 TENSION HEADACHE: ICD-10-CM

## 2021-06-24 RX ORDER — CYCLOBENZAPRINE HCL 10 MG
10 TABLET ORAL 3 TIMES DAILY PRN
Qty: 30 TABLET | Refills: 0 | Status: SHIPPED | OUTPATIENT
Start: 2021-06-24 | End: 2022-06-01

## 2021-06-24 NOTE — TELEPHONE ENCOUNTER
From: Anni Pepe  To: Noni Mora MD  Sent: 6/23/2021 5:43 PM EDT  Subject: Non-Urgent Medical Question    Tension headaches are back was freezing of them for awhile have plenty of the headache med but out of the muscle relaxer if I could possibly get a refill shoulders are so bad feel like they are up to my ears.  I also had to call off tonight and if I could get a note until going back on 6/25/21 until I can get it under control if I need to set a appointment up for after I can thank you Ziggy Cardoza very anxious and stressed lately

## 2021-08-20 DIAGNOSIS — E11.9 TYPE 2 DIABETES MELLITUS WITHOUT COMPLICATION, WITHOUT LONG-TERM CURRENT USE OF INSULIN (HCC): ICD-10-CM

## 2021-08-23 NOTE — TELEPHONE ENCOUNTER
Last Office Visit  -  4/12/21   Next Office Visit  -  8/31/21    Last Filled  -    Last UDS -    Contract -

## 2021-08-31 ENCOUNTER — OFFICE VISIT (OUTPATIENT)
Dept: FAMILY MEDICINE CLINIC | Age: 41
End: 2021-08-31
Payer: COMMERCIAL

## 2021-08-31 VITALS
BODY MASS INDEX: 38.66 KG/M2 | WEIGHT: 261 LBS | DIASTOLIC BLOOD PRESSURE: 70 MMHG | HEART RATE: 94 BPM | SYSTOLIC BLOOD PRESSURE: 136 MMHG | HEIGHT: 69 IN | OXYGEN SATURATION: 99 %

## 2021-08-31 DIAGNOSIS — M54.2 NECK PAIN: ICD-10-CM

## 2021-08-31 DIAGNOSIS — F41.9 ANXIETY AND DEPRESSION: ICD-10-CM

## 2021-08-31 DIAGNOSIS — F32.A ANXIETY AND DEPRESSION: ICD-10-CM

## 2021-08-31 DIAGNOSIS — G25.81 RLS (RESTLESS LEGS SYNDROME): ICD-10-CM

## 2021-08-31 DIAGNOSIS — E11.9 TYPE 2 DIABETES MELLITUS WITHOUT COMPLICATION, WITHOUT LONG-TERM CURRENT USE OF INSULIN (HCC): Primary | ICD-10-CM

## 2021-08-31 LAB — HBA1C MFR BLD: 8.2 %

## 2021-08-31 PROCEDURE — 83036 HEMOGLOBIN GLYCOSYLATED A1C: CPT | Performed by: FAMILY MEDICINE

## 2021-08-31 PROCEDURE — G8417 CALC BMI ABV UP PARAM F/U: HCPCS | Performed by: FAMILY MEDICINE

## 2021-08-31 PROCEDURE — 3052F HG A1C>EQUAL 8.0%<EQUAL 9.0%: CPT | Performed by: FAMILY MEDICINE

## 2021-08-31 PROCEDURE — 99214 OFFICE O/P EST MOD 30 MIN: CPT | Performed by: FAMILY MEDICINE

## 2021-08-31 PROCEDURE — G8427 DOCREV CUR MEDS BY ELIG CLIN: HCPCS | Performed by: FAMILY MEDICINE

## 2021-08-31 PROCEDURE — 2022F DILAT RTA XM EVC RTNOPTHY: CPT | Performed by: FAMILY MEDICINE

## 2021-08-31 PROCEDURE — 1036F TOBACCO NON-USER: CPT | Performed by: FAMILY MEDICINE

## 2021-08-31 RX ORDER — VENLAFAXINE HYDROCHLORIDE 37.5 MG/1
37.5 CAPSULE, EXTENDED RELEASE ORAL DAILY
Qty: 30 CAPSULE | Refills: 3 | Status: SHIPPED | OUTPATIENT
Start: 2021-08-31 | End: 2021-11-22 | Stop reason: SDUPTHER

## 2021-08-31 RX ORDER — ROPINIROLE 0.25 MG/1
0.25 TABLET, FILM COATED ORAL 3 TIMES DAILY
Qty: 90 TABLET | Refills: 3 | Status: SHIPPED | OUTPATIENT
Start: 2021-08-31

## 2021-08-31 RX ORDER — IBUPROFEN 800 MG/1
800 TABLET ORAL 2 TIMES DAILY PRN
Qty: 60 TABLET | Refills: 0 | Status: SHIPPED | OUTPATIENT
Start: 2021-08-31 | End: 2021-10-28

## 2021-09-07 NOTE — PROGRESS NOTES
Cynthia De Paz (:  1980) is a 37 y.o. female,Established patient, here for evaluation of the following chief complaint(s):  Diabetes and Other (restless leg )         ASSESSMENT/PLAN:  1. Type 2 diabetes mellitus without complication, without long-term current use of insulin ([facility])  -     POCT glycosylated hemoglobin (Hb A1C)  2. RLS (restless legs syndrome)  -     rOPINIRole (REQUIP) 0.25 MG tablet; Take 1 tablet by mouth 3 times daily, Disp-90 tablet, R-3Normal  3. Neck pain  -     ibuprofen (ADVIL;MOTRIN) 800 MG tablet; Take 1 tablet by mouth 2 times daily as needed for Pain, Disp-60 tablet, R-0Normal  4. Anxiety and depression  -     venlafaxine (EFFEXOR XR) 37.5 MG extended release capsule; Take 1 capsule by mouth daily, Disp-30 capsule, R-3Normal  Wean off of current SSRI. No follow-ups on file. Subjective   SUBJECTIVE/OBJECTIVE:  Cynthia De Paz is a 37 y.o. female. Patient presents with:  Diabetes  Other: restless leg    This is a 81-year-old female who has diabetes mellitus. She has been working on improving her control of diabetes. She has been losing weight. She has been eating better. She has been taking her medications more regularly. She denies any significant side effects from her medications right now. She denies any polyuria polyphagia or polydipsia. She denies any significant changes in her vision. No numbness or tingling in her extremities. She has had some increased restless legs recently. She notes that this is particularly bad at night but also affects her during the day. She does work as a  and this sometimes affects her ability to work. She also notes that she is having increased issues with anxiety and depression. She does feels that the medication she is taking no longer works for her. She would like to try something new. Patient has been feeling more stressed recently and not been able to sleep very well.     Patient also does have chronic neck pain that she still battles with. She would like a refill of ibuprofen. The patients PMH, surgical history, family history, medications, allergies were all reviewed and updated as appropriate today. Diabetes    Other        Review of Systems       Objective   Physical Exam  Vitals and nursing note reviewed. Constitutional:       Appearance: Normal appearance. She is well-developed. HENT:      Head: Normocephalic and atraumatic. Right Ear: External ear normal.      Left Ear: External ear normal.      Nose: Nose normal.   Eyes:      Conjunctiva/sclera: Conjunctivae normal.      Pupils: Pupils are equal, round, and reactive to light. Cardiovascular:      Rate and Rhythm: Normal rate and regular rhythm. Heart sounds: Normal heart sounds. Pulmonary:      Effort: Pulmonary effort is normal.      Breath sounds: Normal breath sounds. Abdominal:      General: Bowel sounds are normal.      Palpations: Abdomen is soft. Musculoskeletal:         General: Normal range of motion. Cervical back: Normal range of motion and neck supple. Skin:     General: Skin is dry. Neurological:      Mental Status: She is alert and oriented to person, place, and time. Deep Tendon Reflexes: Reflexes are normal and symmetric. Psychiatric:         Behavior: Behavior normal.         Thought Content: Thought content normal.         Judgment: Judgment normal.                An electronic signature was used to authenticate this note.     --Luis Patel MD

## 2021-09-08 LAB
ALBUMIN SERPL-MCNC: 4.2 G/DL
ALP BLD-CCNC: 90 U/L
ALT SERPL-CCNC: 15 U/L
ANION GAP SERPL CALCULATED.3IONS-SCNC: 1.5 MMOL/L
AST SERPL-CCNC: 12 U/L
AVERAGE GLUCOSE: NORMAL
BASOPHILS ABSOLUTE: 0.1 /ΜL
BASOPHILS RELATIVE PERCENT: 1 %
BILIRUB SERPL-MCNC: 0.4 MG/DL (ref 0.1–1.4)
BUN BLDV-MCNC: 16 MG/DL
CALCIUM SERPL-MCNC: 9.6 MG/DL
CHLORIDE BLD-SCNC: 97 MMOL/L
CHOLESTEROL, TOTAL: 173 MG/DL
CHOLESTEROL/HDL RATIO: NORMAL
CO2: 20 MMOL/L
CREAT SERPL-MCNC: 0.72 MG/DL
EOSINOPHILS ABSOLUTE: 0.2 /ΜL
EOSINOPHILS RELATIVE PERCENT: 2 %
GFR CALCULATED: 105
GLUCOSE BLD-MCNC: 267 MG/DL
HBA1C MFR BLD: 8.2 %
HCT VFR BLD CALC: 46.3 % (ref 36–46)
HDLC SERPL-MCNC: 45 MG/DL (ref 35–70)
HEMOGLOBIN: 15.7 G/DL (ref 12–16)
LDL CHOLESTEROL CALCULATED: 102 MG/DL (ref 0–160)
LYMPHOCYTES ABSOLUTE: 2.9 /ΜL
LYMPHOCYTES RELATIVE PERCENT: 27 %
MCH RBC QN AUTO: 30.8 PG
MCHC RBC AUTO-ENTMCNC: 33.9 G/DL
MCV RBC AUTO: 91 FL
MONOCYTES ABSOLUTE: 0.5 /ΜL
MONOCYTES RELATIVE PERCENT: 5 %
NEUTROPHILS ABSOLUTE: 5.9 /ΜL
NEUTROPHILS RELATIVE PERCENT: 65 %
NONHDLC SERPL-MCNC: NORMAL MG/DL
PLATELET # BLD: 398 K/ΜL
PMV BLD AUTO: ABNORMAL FL
POTASSIUM SERPL-SCNC: 4.4 MMOL/L
RBC # BLD: 5.09 10^6/ΜL
SODIUM BLD-SCNC: 134 MMOL/L
TOTAL PROTEIN: 7
TRIGL SERPL-MCNC: 149 MG/DL
VLDLC SERPL CALC-MCNC: 26 MG/DL
WBC # BLD: 10.5 10^3/ML

## 2021-09-09 DIAGNOSIS — E13.69 OTHER SPECIFIED DIABETES MELLITUS WITH OTHER SPECIFIED COMPLICATION, UNSPECIFIED WHETHER LONG TERM INSULIN USE (HCC): ICD-10-CM

## 2021-09-10 RX ORDER — LIRAGLUTIDE 6 MG/ML
INJECTION SUBCUTANEOUS
Qty: 12 ML | Refills: 5 | Status: SHIPPED | OUTPATIENT
Start: 2021-09-10 | End: 2021-09-23 | Stop reason: SDUPTHER

## 2021-09-10 RX ORDER — METRONIDAZOLE 7.5 MG/G
GEL TOPICAL
Qty: 45 G | Refills: 0 | Status: SHIPPED | OUTPATIENT
Start: 2021-09-10 | End: 2022-10-25

## 2021-09-15 NOTE — TELEPHONE ENCOUNTER
Lexus Be from [facility] called [phone]. She tried to put the Victoza through and it's still won't go through even though she had a note saying it had gone through 9/14/21. [facility] [phone].

## 2021-09-23 DIAGNOSIS — E13.69 OTHER SPECIFIED DIABETES MELLITUS WITH OTHER SPECIFIED COMPLICATION, UNSPECIFIED WHETHER LONG TERM INSULIN USE (HCC): ICD-10-CM

## 2021-09-23 RX ORDER — LIRAGLUTIDE 6 MG/ML
INJECTION SUBCUTANEOUS
Qty: 12 ML | Refills: 5 | Status: SHIPPED | OUTPATIENT
Start: 2021-09-23 | End: 2022-05-04

## 2021-10-28 DIAGNOSIS — M54.2 NECK PAIN: ICD-10-CM

## 2021-10-28 RX ORDER — IBUPROFEN 800 MG/1
TABLET ORAL
Qty: 60 TABLET | Refills: 0 | Status: SHIPPED | OUTPATIENT
Start: 2021-10-28 | End: 2021-12-15

## 2021-10-28 NOTE — TELEPHONE ENCOUNTER
Last Office Visit  -  8/31/21  Next Office Visit  -  n/a    Last Filled  -    Last UDS -    Contract -

## 2021-12-15 ENCOUNTER — VIRTUAL VISIT (OUTPATIENT)
Dept: FAMILY MEDICINE CLINIC | Age: 41
End: 2021-12-15
Payer: COMMERCIAL

## 2021-12-15 DIAGNOSIS — B96.89 ACUTE BACTERIAL SINUSITIS: Primary | ICD-10-CM

## 2021-12-15 DIAGNOSIS — M54.2 NECK PAIN: ICD-10-CM

## 2021-12-15 DIAGNOSIS — J01.90 ACUTE BACTERIAL SINUSITIS: Primary | ICD-10-CM

## 2021-12-15 PROCEDURE — G8427 DOCREV CUR MEDS BY ELIG CLIN: HCPCS | Performed by: FAMILY MEDICINE

## 2021-12-15 PROCEDURE — 99213 OFFICE O/P EST LOW 20 MIN: CPT | Performed by: FAMILY MEDICINE

## 2021-12-15 RX ORDER — AZITHROMYCIN 250 MG/1
250 TABLET, FILM COATED ORAL SEE ADMIN INSTRUCTIONS
Qty: 6 TABLET | Refills: 0 | Status: SHIPPED | OUTPATIENT
Start: 2021-12-15 | End: 2022-10-25 | Stop reason: SDUPTHER

## 2021-12-15 RX ORDER — METHYLPREDNISOLONE 4 MG/1
TABLET ORAL
Qty: 1 KIT | Refills: 0 | Status: SHIPPED | OUTPATIENT
Start: 2021-12-15 | End: 2022-05-04

## 2021-12-15 RX ORDER — IBUPROFEN 800 MG/1
TABLET ORAL
Qty: 60 TABLET | Refills: 0 | Status: SHIPPED | OUTPATIENT
Start: 2021-12-15 | End: 2022-02-25

## 2021-12-15 ASSESSMENT — ENCOUNTER SYMPTOMS: COUGH: 1

## 2021-12-15 NOTE — PROGRESS NOTES
Katie Boyd (:  1980) is a 37 y.o. female,Established patient, here for evaluation of the following chief complaint(s): Cough (covid + wants z-pack)         ASSESSMENT/PLAN:  1. Acute bacterial sinusitis  -     methylPREDNISolone (MEDROL, GRECIA,) 4 MG tablet; Take by mouth as directed, Disp-1 kit, R-0Normal  -     azithromycin (ZITHROMAX) 250 MG tablet; Take 1 tablet by mouth See Admin Instructions for 5 days 500mg on day 1 followed by 250mg on days 2 - 5, Disp-6 tablet, R-0Normal      No follow-ups on file. SUBJECTIVE/OBJECTIVE:  Katie Boyd is a 37 y.o. female. Patient presents with:  Cough: covid + wants z-pack      Patient is a 38 yo female who presents for f/u of Covid that she has had for over a week. She did not lose taste or smell. Has body aches, leg cramps, and fever. Has taking ibuprofen which is helpful. Has been still having fever off and on. Still having some sinus pressure, ear pressure, and congestion. Has had a bit of cough. Has had a few people at work has been exposed. Patient has been taking zinc and vitamin d. The patients PMH, surgical history, family history, medications, allergies were all reviewed and updated as appropriate today. Katie Boyd is a 37 y.o. female. Patient presents with:  Cough: covid + wants z-pack        The patients PMH, surgical history, family history, medications, allergies were all reviewed and updated as appropriate today. Cough        Review of Systems   Respiratory: Positive for cough.         Patient-Reported Vitals 12/15/2021   Patient-Reported Weight 252lb        Physical Exam    [INSTRUCTIONS:  \"[x]\" Indicates a positive item  \"[]\" Indicates a negative item  -- DELETE ALL ITEMS NOT EXAMINED]    Constitutional: [x] Appears well-developed and well-nourished [x] No apparent distress      [] Abnormal -     Mental status: [x] Alert and awake  [x] Oriented to person/place/time [x] Able to follow commands    [] Abnormal - Eyes:   EOM    [x]  Normal    [] Abnormal -   Sclera  [x]  Normal    [] Abnormal -          Discharge [x]  None visible   [] Abnormal -     HENT: [x] Normocephalic, atraumatic  [] Abnormal -   [x] Mouth/Throat: Mucous membranes are moist    External Ears [x] Normal  [] Abnormal -    Neck: [x] No visualized mass [] Abnormal -     Pulmonary/Chest: [x] Respiratory effort normal   [x] No visualized signs of difficulty breathing or respiratory distress        [] Abnormal -      Musculoskeletal:   [x] Normal gait with no signs of ataxia         [x] Normal range of motion of neck        [] Abnormal -     Neurological:        [x] No Facial Asymmetry (Cranial nerve 7 motor function) (limited exam due to video visit)          [x] No gaze palsy        [] Abnormal -          Skin:        [x] No significant exanthematous lesions or discoloration noted on facial skin         [] Abnormal -            Psychiatric:       [x] Normal Affect [] Abnormal -        [x] No Hallucinations    Other pertinent observable physical exam findings:-          On this date 12/15/2021 I have spent 20 minutes reviewing previous notes, test results and face to face (virtual) with the patient discussing the diagnosis and importance of compliance with the treatment plan as well as documenting on the day of the visit. Doug Schwartz, was evaluated through a synchronous (real-time) audio-video encounter. The patient (or guardian if applicable) is aware that this is a billable service. Verbal consent to proceed has been obtained within the past 12 months. The visit was conducted pursuant to the emergency declaration under the [address] [place] [address] and the [employer] Act. Patient identification was verified, and a caregiver was present when appropriate.  The patient was located in a state where the provider was credentialed to provide care.      An electronic signature was used to authenticate this note.     --Anabella Leos MD

## 2022-02-25 DIAGNOSIS — M54.2 NECK PAIN: ICD-10-CM

## 2022-02-25 DIAGNOSIS — A60.9 HSV (HERPES SIMPLEX VIRUS) ANOGENITAL INFECTION: ICD-10-CM

## 2022-02-25 RX ORDER — ACYCLOVIR 800 MG/1
800 TABLET ORAL DAILY
Qty: 30 TABLET | Refills: 11 | Status: SHIPPED | OUTPATIENT
Start: 2022-02-25

## 2022-02-25 RX ORDER — IBUPROFEN 800 MG/1
TABLET ORAL
Qty: 60 TABLET | Refills: 0 | Status: SHIPPED | OUTPATIENT
Start: 2022-02-25 | End: 2022-03-21

## 2022-02-25 NOTE — TELEPHONE ENCOUNTER
Patient is scheduled for PAP 3/23/22, please see pended medication.      Routing to Dr. Thao Wilburn

## 2022-03-18 DIAGNOSIS — M54.2 NECK PAIN: ICD-10-CM

## 2022-03-21 RX ORDER — IBUPROFEN 800 MG/1
TABLET ORAL
Qty: 60 TABLET | Refills: 0 | Status: SHIPPED | OUTPATIENT
Start: 2022-03-21 | End: 2022-05-27

## 2022-04-12 LAB
CREATININE, URINE: NORMAL
MICROALBUMIN/CREAT 24H UR: 5.2 MG/G{CREAT}
MICROALBUMIN/CREAT UR-RTO: 5

## 2022-04-15 DIAGNOSIS — E11.9 TYPE 2 DIABETES MELLITUS WITHOUT COMPLICATION, WITHOUT LONG-TERM CURRENT USE OF INSULIN (HCC): ICD-10-CM

## 2022-04-15 DIAGNOSIS — N92.6 MENSTRUAL IRREGULARITY: ICD-10-CM

## 2022-04-15 RX ORDER — PIOGLITAZONEHYDROCHLORIDE 15 MG/1
15 TABLET ORAL DAILY
Qty: 30 TABLET | Refills: 3 | Status: SHIPPED | OUTPATIENT
Start: 2022-04-15 | End: 2022-10-10

## 2022-04-15 RX ORDER — SPIRONOLACTONE 25 MG/1
25 TABLET ORAL DAILY
Qty: 30 TABLET | Refills: 5 | Status: SHIPPED | OUTPATIENT
Start: 2022-04-15

## 2022-05-04 ENCOUNTER — OFFICE VISIT (OUTPATIENT)
Dept: FAMILY MEDICINE CLINIC | Age: 42
End: 2022-05-04
Payer: COMMERCIAL

## 2022-05-04 VITALS
WEIGHT: 271 LBS | SYSTOLIC BLOOD PRESSURE: 124 MMHG | BODY MASS INDEX: 40.14 KG/M2 | OXYGEN SATURATION: 99 % | HEIGHT: 69 IN | DIASTOLIC BLOOD PRESSURE: 82 MMHG | HEART RATE: 92 BPM

## 2022-05-04 DIAGNOSIS — F32.A ANXIETY AND DEPRESSION: ICD-10-CM

## 2022-05-04 DIAGNOSIS — F41.9 ANXIETY AND DEPRESSION: ICD-10-CM

## 2022-05-04 DIAGNOSIS — E11.9 TYPE 2 DIABETES MELLITUS WITHOUT COMPLICATION, WITHOUT LONG-TERM CURRENT USE OF INSULIN (HCC): Primary | ICD-10-CM

## 2022-05-04 LAB — HBA1C MFR BLD: 11.6 %

## 2022-05-04 PROCEDURE — 3046F HEMOGLOBIN A1C LEVEL >9.0%: CPT | Performed by: FAMILY MEDICINE

## 2022-05-04 PROCEDURE — G8427 DOCREV CUR MEDS BY ELIG CLIN: HCPCS | Performed by: FAMILY MEDICINE

## 2022-05-04 PROCEDURE — 2022F DILAT RTA XM EVC RTNOPTHY: CPT | Performed by: FAMILY MEDICINE

## 2022-05-04 PROCEDURE — G8417 CALC BMI ABV UP PARAM F/U: HCPCS | Performed by: FAMILY MEDICINE

## 2022-05-04 PROCEDURE — 99214 OFFICE O/P EST MOD 30 MIN: CPT | Performed by: FAMILY MEDICINE

## 2022-05-04 PROCEDURE — 83036 HEMOGLOBIN GLYCOSYLATED A1C: CPT | Performed by: FAMILY MEDICINE

## 2022-05-04 PROCEDURE — 1036F TOBACCO NON-USER: CPT | Performed by: FAMILY MEDICINE

## 2022-05-04 RX ORDER — SEMAGLUTIDE 1.34 MG/ML
1 INJECTION, SOLUTION SUBCUTANEOUS WEEKLY
Qty: 9 ML | Refills: 1 | Status: SHIPPED | OUTPATIENT
Start: 2022-05-04

## 2022-05-04 RX ORDER — SEMAGLUTIDE 1.34 MG/ML
INJECTION, SOLUTION SUBCUTANEOUS
Qty: 2 PEN | Refills: 0 | Status: SHIPPED | OUTPATIENT
Start: 2022-05-04 | End: 2022-06-29

## 2022-05-04 RX ORDER — VENLAFAXINE HYDROCHLORIDE 75 MG/1
75 CAPSULE, EXTENDED RELEASE ORAL DAILY
Qty: 30 CAPSULE | Refills: 5 | Status: SHIPPED | OUTPATIENT
Start: 2022-05-04

## 2022-05-04 ASSESSMENT — PATIENT HEALTH QUESTIONNAIRE - PHQ9
5. POOR APPETITE OR OVEREATING: 2
2. FEELING DOWN, DEPRESSED OR HOPELESS: 1
SUM OF ALL RESPONSES TO PHQ QUESTIONS 1-9: 13
8. MOVING OR SPEAKING SO SLOWLY THAT OTHER PEOPLE COULD HAVE NOTICED. OR THE OPPOSITE, BEING SO FIGETY OR RESTLESS THAT YOU HAVE BEEN MOVING AROUND A LOT MORE THAN USUAL: 1
4. FEELING TIRED OR HAVING LITTLE ENERGY: 2
SUM OF ALL RESPONSES TO PHQ QUESTIONS 1-9: 13
7. TROUBLE CONCENTRATING ON THINGS, SUCH AS READING THE NEWSPAPER OR WATCHING TELEVISION: 1
SUM OF ALL RESPONSES TO PHQ QUESTIONS 1-9: 13
9. THOUGHTS THAT YOU WOULD BE BETTER OFF DEAD, OR OF HURTING YOURSELF: 0
SUM OF ALL RESPONSES TO PHQ QUESTIONS 1-9: 13
1. LITTLE INTEREST OR PLEASURE IN DOING THINGS: 2
3. TROUBLE FALLING OR STAYING ASLEEP: 2
6. FEELING BAD ABOUT YOURSELF - OR THAT YOU ARE A FAILURE OR HAVE LET YOURSELF OR YOUR FAMILY DOWN: 2
10. IF YOU CHECKED OFF ANY PROBLEMS, HOW DIFFICULT HAVE THESE PROBLEMS MADE IT FOR YOU TO DO YOUR WORK, TAKE CARE OF THINGS AT HOME, OR GET ALONG WITH OTHER PEOPLE: 0
SUM OF ALL RESPONSES TO PHQ9 QUESTIONS 1 & 2: 3

## 2022-05-10 ENCOUNTER — OFFICE VISIT (OUTPATIENT)
Dept: OBGYN CLINIC | Age: 42
End: 2022-05-10
Payer: COMMERCIAL

## 2022-05-10 VITALS
DIASTOLIC BLOOD PRESSURE: 78 MMHG | BODY MASS INDEX: 39.64 KG/M2 | TEMPERATURE: 98 F | WEIGHT: 268.4 LBS | SYSTOLIC BLOOD PRESSURE: 124 MMHG | HEART RATE: 91 BPM

## 2022-05-10 DIAGNOSIS — Z86.19 HX OF HERPES GENITALIS: ICD-10-CM

## 2022-05-10 DIAGNOSIS — Z20.2 POSSIBLE EXPOSURE TO STD: ICD-10-CM

## 2022-05-10 DIAGNOSIS — Z12.39 SCREENING BREAST EXAMINATION: ICD-10-CM

## 2022-05-10 DIAGNOSIS — N76.0 RECURRENT VAGINITIS: ICD-10-CM

## 2022-05-10 DIAGNOSIS — D68.51 HETEROZYGOUS FACTOR V LEIDEN MUTATION (HCC): ICD-10-CM

## 2022-05-10 DIAGNOSIS — Z86.718 HX OF CEREBRAL VENOUS SINUS THROMBOSIS: ICD-10-CM

## 2022-05-10 DIAGNOSIS — E11.9 TYPE 2 DIABETES MELLITUS WITHOUT COMPLICATION, WITHOUT LONG-TERM CURRENT USE OF INSULIN (HCC): ICD-10-CM

## 2022-05-10 DIAGNOSIS — E66.01 CLASS 3 SEVERE OBESITY DUE TO EXCESS CALORIES WITHOUT SERIOUS COMORBIDITY WITH BODY MASS INDEX (BMI) OF 40.0 TO 44.9 IN ADULT (HCC): ICD-10-CM

## 2022-05-10 DIAGNOSIS — Z01.419 WOMEN'S ANNUAL ROUTINE GYNECOLOGICAL EXAMINATION: Primary | ICD-10-CM

## 2022-05-10 DIAGNOSIS — Z12.31 BREAST CANCER SCREENING BY MAMMOGRAM: ICD-10-CM

## 2022-05-10 DIAGNOSIS — Z97.5 IUD (INTRAUTERINE DEVICE) IN PLACE: ICD-10-CM

## 2022-05-10 PROCEDURE — 99396 PREV VISIT EST AGE 40-64: CPT | Performed by: OBSTETRICS & GYNECOLOGY

## 2022-05-10 ASSESSMENT — ENCOUNTER SYMPTOMS
DIARRHEA: 0
BACK PAIN: 0
SHORTNESS OF BREATH: 0
SORE THROAT: 0
CHEST TIGHTNESS: 0
ROS SKIN COMMENTS: ABNORMAL HAIR GROWTH
ABDOMINAL PAIN: 0
VOMITING: 0
WHEEZING: 0
COLOR CHANGE: 0
NAUSEA: 0

## 2022-05-10 NOTE — PROGRESS NOTES
[facility]  Annual Exam    CC:   Chief Complaint   Patient presents with    Annual Exam       HPI: Bhavya Huerta is a 37 y.o. female who presents for annual exam.   Pt is doing well today without complaints. No changes to medical history. Other medical problems at baseline. Does admit to poorly controlled type 2 diabetes, last hemoglobin A1c was 11.6. Pt has had a Hysteroscopy D and C on 12/19/19 due to endometrial polyps. Has had a hx of cerebral venous thrombosis due to Diego use. She is heterozygous for factor V leiden. Patient is interested in having her IUD removed for conception purposes. She is with a partner and they are interested in pregnancy. I briefly reviewed her medical history and the high risk nature of such a pregnancy. However I think she would benefit from formal preconceptual counseling with [facility], patient is amenable to this plan. Health Maintenance:  Safe Enviroment: Y  Healthy diet: diabetic diet  Exercise: y occasional   Sexually Active: y (male partner)   denies sexual problems  Contraception: Mirena IUD (placed 4/9/19)    Screening:  Last pap smear: PAP 1/18/21 NILM/ HPV (-)   History of abnormal pap smears: Denies  STI History: Y -- HSV    1st outbreak 1/10/19      Vaccines:  Gardasil vaccine: N  Flu vaccine: N     Cerebral Vein Thrombosis (9/4/18) leading to stoke/seizure from DIEGO --is not on any anticoagulation, states she stopped Xarelto shortly after the incident. Review of Systems:   Review of Systems   Constitutional: Negative for appetite change, chills and fever. HENT: Negative for congestion, sneezing and sore throat. Eyes: Negative for visual disturbance. Respiratory: Negative for chest tightness, shortness of breath and wheezing. Cardiovascular: Negative for chest pain, palpitations and leg swelling. Gastrointestinal: Negative for abdominal pain, diarrhea, nausea and vomiting. Endocrine: Negative for heat intolerance.    Genitourinary: Negative for difficulty urinating, dyspareunia, dysuria, menstrual problem (doing well with IUD), pelvic pain, vaginal bleeding and vaginal discharge. Musculoskeletal: Negative for arthralgias, back pain, neck pain and neck stiffness. Skin: Negative for color change, pallor and rash. Abnormal hair growth   Allergic/Immunologic: Negative for environmental allergies, food allergies and immunocompromised state. Neurological: Negative for seizures, light-headedness, numbness and headaches. Hematological: Does not bruise/bleed easily. Psychiatric/Behavioral: Negative for behavioral problems, sleep disturbance and suicidal ideas. Gynecologic History  Menstrual History:   LMP: Patient's last menstrual period was 04/15/2022.  Menstrual Period: irregular   Interval Between Menses: unk   Duration of Menses: unk   Menstrual Flow: occasional spotting   Bleeding between menses: occasional    Since IUD placement     Obstetrical History:  OB History        0    Para   0    Term   0       0    AB   0    Living   0       SAB   0    IAB   0    Ectopic   0    Molar   0    Multiple   0    Live Births   0              Past Medical History:   Past Medical History:   Diagnosis Date    Class 3 severe obesity due to excess calories with body mass index (BMI) of 40.0 to 44.9 in adult [facility]) 2018    Hx of blood clots     \"clot in head caused seizure\"    New onset seizure ([facility] [address] 2018    Type 2 diabetes mellitus without complication ([facility]        Medications:  Current Outpatient Medications   Medication Sig Dispense Refill    Semaglutide,0.25 or 0.5MG/DOS, (OZEMPIC, 0.25 OR 0.5 MG/DOSE,) 2 MG/1.5ML SOPN Inject 0.25 mg into the skin once a week for 28 days, THEN 0.5 mg once a week for 28 days.  2 pen 0    Semaglutide, 1 MG/DOSE, (OZEMPIC, 1 MG/DOSE,) 4 MG/3ML SOPN Inject 1 mg into the skin once a week 9 mL 1    venlafaxine (EFFEXOR XR) 75 MG extended release capsule Take 1 capsule by mouth daily 30 capsule 5    spironolactone (ALDACTONE) 25 MG tablet TAKE 1 TABLET BY MOUTH DAILY 30 tablet 5    pioglitazone (ACTOS) 15 MG tablet TAKE 1 TABLET BY MOUTH DAILY 30 tablet 3    ibuprofen (ADVIL;MOTRIN) 800 MG tablet TAKE 1 TABLET BY MOUTH TWICE DAILY AS NEEDED FOR PAIN 60 tablet 0    acyclovir (ZOVIRAX) 800 MG tablet TAKE 1 TABLET BY MOUTH DAILY 30 tablet 11    rOPINIRole (REQUIP) 0.25 MG tablet Take 1 tablet by mouth 3 times daily 90 tablet 3    metFORMIN (GLUCOPHAGE) 1000 MG tablet TAKE 1 TABLET BY MOUTH TWICE DAILY WITH MEALS 60 tablet 5    levonorgestrel (MIRENA, 52 MG,) IUD 52 mg 1 each by Intrauterine route once      metroNIDAZOLE (METROGEL) 0.75 % gel APPLY EXTERNALLY TO THE AFFECTED AREA TWICE DAILY (Patient not taking: Reported on 12/15/2021) 45 g 0    fluticasone (FLONASE) 50 MCG/ACT nasal spray 2 sprays by Each Nostril route daily (Patient not taking: Reported on 5/10/2022) 1 Bottle 5    triamcinolone (KENALOG) 0.1 % cream Apply topically 2 times daily.  (Patient not taking: Reported on 12/15/2021) 30 g 0    ONE TOUCH ULTRA TEST strip USE TO TEST DAILY (Patient not taking: Reported on 12/15/2021) 100 strip 5    estradiol (ESTRACE VAGINAL) 0.1 MG/GM vaginal cream Place 1 g vaginally daily (Patient not taking: Reported on 5/4/2022) 1 Tube 3    Blood Glucose Monitoring Suppl (ONE TOUCH ULTRA MINI) w/Device KIT  (Patient not taking: Reported on 12/15/2021)      Asuncion POSEYETS 42F MISC Use to test daily (Patient not taking: Reported on 12/15/2021) 100 each 1     Current Facility-Administered Medications   Medication Dose Route Frequency Provider Last Rate Last Admin    levonorgestrel (MIRENA) IUD 52 mg 1 each  1 each IntraUTERine Once [facility], DO   1 each at 04/09/19 1028       Allergies:  Codeine, Prochlorperazine edisylate, and Sulfa antibiotics    Surgical History:  Past Surgical History:   Procedure Laterality Date    DILATION AND CURETTAGE OF UTERUS N/A 12/19/2018    VIDEO HYSTEROSCOPY DILATATION AND CURETTAGE [facility] POSSIBLE [facility]OS[facility]         Family History:  Family History   Problem Relation Age of Onset    High Blood Pressure Father     No Known Problems Mother     Cancer Maternal Uncle         lung    Alcohol Abuse Paternal Grandfather     No Known Problems Paternal Grandmother     No Known Problems Maternal Grandmother     No Known Problems Maternal Grandfather     Heart Failure Sister     No Known Problems Paternal Aunt     No Known Problems Maternal Uncle     No Known Problems Maternal Uncle     COPD Maternal Aunt     Other Paternal Aunt         suicide       Social History:  Social History     Substance and Sexual Activity   Alcohol Use Yes     Social History     Substance and Sexual Activity   Drug Use No     Social History     Tobacco Use   Smoking Status Never Smoker   Smokeless Tobacco Never Used       Physical Exam:  /78 (Site: Left Upper Arm, Position: Sitting, Cuff Size: Large Adult)   Pulse 91   Temp 98 °F (36.7 °C) (Infrared)   Wt 268 lb 6.4 oz (121.7 kg)   LMP 04/15/2022   BMI 39.64 kg/m²   General: Alert, well appearing, no acute distress  Head: Normocephalic, atraumatic  Breasts: breasts appear normal, no suspicious masses, no skin or nipple changes or axillary nodes. Nipple piercing noted bilaterally -- well healed. Lungs: Resp effort normal.   CV: Regular rate. Abdomen: Soft, nontender, nondistended   Pelvic exam: VULVA: normal appearing vulva with no masses, tenderness or lesions, VAGINA: normal appearing vagina with normal color and discharge, no lesions, CERVIX: normal appearing cervix without discharge or lesions, IUD strings noted 2cm from os, UTERUS: uterus is normal size, shape, consistency and nontender, ADNEXA: normal adnexa in size, nontender and no masses, exam chaperoned by female MA,  swabs collected.   Rectovaginal: deferred  Extremities: No redness or tenderness, neg Milad's sign  Skin: Well perfused, normal coloration and turgor, no lesions or rashes visualized  Neuro: Alert, oriented, normal speech, no focal deficits, moves extremities appropriately  Osteopathic: No TART changes    Labs:  No visits with results within 1 Day(s) from this visit. Latest known visit with results is:   Abstract on 05/05/2022   Component Date Value    Microalbumin Creatinine * 04/12/2022 5     Microalb, Ur 04/12/2022 5.2      Assessment/Plan:   37 y.o.  female presents for annual exam:     Diagnosis Orders   1. Women's annual routine gynecological examination     2. Breast cancer screening by mammogram  ADRIÁN DIGITAL SCREEN W OR WO CAD BILATERAL   3. Screening breast examination     4. Possible exposure to STD  VAGINAL PATHOGENS PROBE *A    C.trachomatis N.gonorrhoeae DNA   5. Recurrent vaginitis     6. IUD (intrauterine device) in place     7. Hx of herpes genitalis     8. Hx of cerebral venous sinus thrombosis     9. Class 3 severe obesity due to excess calories without serious comorbidity with body mass index (BMI) of 40.0 to 44.9 in adult ([facility] [address]     10. Heterozygous factor V Leiden mutation ([facility] [address]     11. Type 2 diabetes mellitus without complication, without long-term current use of insulin ([facility] [address]       - doing well  - swabs pending  - IUD in place doing well, needs removed/replaced 4/2024   - Recommend mammogram, order placed  - Recommend [facility] to medical comorbidities complicating a potential pregnancy, she is amenable to this and paperwork was signed today. - We will treat vaginitis pending results    Annual Visit Recommendations:   Clinical breast exam was done and self-breast exam and self-breast awareness reviewed. Pelvic exam was done and ASCCP guidelines reviewed   Reviewed healthy diet and daily multivitamin use.      Reviewed recommendations on Calcium and Vitamin D intake. Discussed seatbelt use. Follow Up  Will call with results   Return for As needed or if she elects to remove IUD.     Betzaida Mckeon, DO

## 2022-05-11 LAB
C TRACH DNA GENITAL QL NAA+PROBE: NEGATIVE
CANDIDA SPECIES, DNA PROBE: NORMAL
GARDNERELLA VAGINALIS, DNA PROBE: NORMAL
N. GONORRHOEAE DNA: NEGATIVE
TRICHOMONAS VAGINALIS DNA: NORMAL

## 2022-05-15 NOTE — PROGRESS NOTES
Nikunj Kang (:  1980) is a 37 y.o. female,Established patient, here for evaluation of the following chief complaint(s):  Diabetes and Anxiety         ASSESSMENT/PLAN:  1. Type 2 diabetes mellitus without complication, without long-term current use of insulin (HCC)  -     POCT glycosylated hemoglobin (Hb A1C)  -     Semaglutide,0.25 or 0.5MG/DOS, (OZEMPIC, 0.25 OR 0.5 MG/DOSE,) 2 MG/1.5ML SOPN; Inject 0.25 mg into the skin once a week for 28 days, THEN 0.5 mg once a week for 28 days. , Disp-2 pen, R-0Normal  -     Semaglutide, 1 MG/DOSE, (OZEMPIC, 1 MG/DOSE,) 4 MG/3ML SOPN; Inject 1 mg into the skin once a week, Disp-9 mL, R-1Normal  Due to abnormal weight will trial patient on Ozempic. 2. Anxiety and depression  -     venlafaxine (EFFEXOR XR) 75 MG extended release capsule; Take 1 capsule by mouth daily, Disp-30 capsule, R-5Normal  Patient been increasing her Effexor. No follow-ups on file. Subjective   SUBJECTIVE/OBJECTIVE:  Nikunj Kang is a 37 y.o. female. Patient presents with:  Diabetes  Anxiety      81-year-old female presents for the following conditions    1. Diabetes mellitus. Patient has been taking her medications regularly. Denies any significant polyuria polyphagia or polydipsia. She has been feeling fairly well in that regard. She is maintaining her weight. 2.  Anxiety. She has noticed increased anxiety recently. She is on a low-dose of venlafaxine. This is been controlling things up until recently when her anxiety has increased. She has had a couple of panic attacks. She is sleeping well at night. Her eating is the same. The patients PMH, surgical history, family history, medications, allergies were all reviewed and updated as appropriate today. Diabetes        Review of Systems       Objective   Physical Exam  Vitals and nursing note reviewed. Constitutional:       Appearance: Normal appearance. She is well-developed.    HENT:      Head: Normocephalic and atraumatic. Right Ear: External ear normal.      Left Ear: External ear normal.      Nose: Nose normal.   Eyes:      Conjunctiva/sclera: Conjunctivae normal.      Pupils: Pupils are equal, round, and reactive to light. Cardiovascular:      Rate and Rhythm: Normal rate and regular rhythm. Heart sounds: Normal heart sounds. Pulmonary:      Effort: Pulmonary effort is normal.      Breath sounds: Normal breath sounds. Abdominal:      General: Bowel sounds are normal.      Palpations: Abdomen is soft. Musculoskeletal:         General: Normal range of motion. Cervical back: Normal range of motion and neck supple. Skin:     General: Skin is dry. Neurological:      Mental Status: She is alert and oriented to person, place, and time. Deep Tendon Reflexes: Reflexes are normal and symmetric. Psychiatric:         Behavior: Behavior normal.         Thought Content: Thought content normal.         Judgment: Judgment normal.              An electronic signature was used to authenticate this note.     --Maryann Martines MD

## 2022-05-26 ENCOUNTER — OFFICE VISIT (OUTPATIENT)
Dept: FAMILY MEDICINE CLINIC | Age: 42
End: 2022-05-26
Payer: COMMERCIAL

## 2022-05-26 VITALS
HEIGHT: 69 IN | BODY MASS INDEX: 39.25 KG/M2 | SYSTOLIC BLOOD PRESSURE: 116 MMHG | WEIGHT: 265 LBS | HEART RATE: 58 BPM | DIASTOLIC BLOOD PRESSURE: 76 MMHG | OXYGEN SATURATION: 93 %

## 2022-05-26 DIAGNOSIS — M54.2 NECK PAIN: ICD-10-CM

## 2022-05-26 DIAGNOSIS — M54.32 SCIATICA OF LEFT SIDE: Primary | ICD-10-CM

## 2022-05-26 PROCEDURE — G8427 DOCREV CUR MEDS BY ELIG CLIN: HCPCS | Performed by: FAMILY MEDICINE

## 2022-05-26 PROCEDURE — 99214 OFFICE O/P EST MOD 30 MIN: CPT | Performed by: FAMILY MEDICINE

## 2022-05-26 PROCEDURE — G8417 CALC BMI ABV UP PARAM F/U: HCPCS | Performed by: FAMILY MEDICINE

## 2022-05-26 PROCEDURE — 1036F TOBACCO NON-USER: CPT | Performed by: FAMILY MEDICINE

## 2022-05-26 RX ORDER — GABAPENTIN 100 MG/1
100 CAPSULE ORAL 3 TIMES DAILY
Qty: 90 CAPSULE | Refills: 0 | Status: SHIPPED | OUTPATIENT
Start: 2022-05-26 | End: 2022-11-22

## 2022-05-26 RX ORDER — HYDROCODONE BITARTRATE AND ACETAMINOPHEN 7.5; 325 MG/1; MG/1
1 TABLET ORAL EVERY 6 HOURS PRN
Qty: 28 TABLET | Refills: 0 | Status: SHIPPED | OUTPATIENT
Start: 2022-05-26 | End: 2022-06-02 | Stop reason: SDUPTHER

## 2022-05-26 NOTE — LETTER
[address]  [address] [facility] [address] 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 She may return to work on 5/30/2022. If you have any questions or concerns, please don't hesitate to call.     Sincerely,         Rocío Ann MD

## 2022-05-27 RX ORDER — IBUPROFEN 800 MG/1
TABLET ORAL
Qty: 60 TABLET | Refills: 0 | Status: SHIPPED | OUTPATIENT
Start: 2022-05-27 | End: 2022-06-23

## 2022-05-27 NOTE — TELEPHONE ENCOUNTER
Last Office Visit  -  8.69.68 [facility]  Next Office Visit  -  5.31.22 [facility]

## 2022-06-01 ENCOUNTER — PATIENT MESSAGE (OUTPATIENT)
Dept: FAMILY MEDICINE CLINIC | Age: 42
End: 2022-06-01

## 2022-06-01 DIAGNOSIS — M54.32 SCIATICA OF LEFT SIDE: ICD-10-CM

## 2022-06-01 DIAGNOSIS — G44.209 TENSION HEADACHE: ICD-10-CM

## 2022-06-01 RX ORDER — CYCLOBENZAPRINE HCL 10 MG
TABLET ORAL
Qty: 30 TABLET | Refills: 0 | Status: SHIPPED | OUTPATIENT
Start: 2022-06-01 | End: 2022-09-25 | Stop reason: SDUPTHER

## 2022-06-02 RX ORDER — HYDROCODONE BITARTRATE AND ACETAMINOPHEN 7.5; 325 MG/1; MG/1
1 TABLET ORAL EVERY 6 HOURS PRN
Qty: 28 TABLET | Refills: 0 | Status: SHIPPED | OUTPATIENT
Start: 2022-06-02 | End: 2022-09-26 | Stop reason: SDUPTHER

## 2022-06-02 NOTE — TELEPHONE ENCOUNTER
From: Page Pepe  To: Dr. Lisa Saravia: 6/1/2022 3:07 PM EDT  Subject: Pinched nerve pain    Seeing if would be possible to get a refill on pain meds. Just for another week or so . Tolerating mostly with 800 ibuprofen. But still having some bad pain the ibuprofen isnt helping much . Like today the 800 isnt helping very well.  Thank you

## 2022-06-10 NOTE — PROGRESS NOTES
Esha Padilla (:  1980) is a 40 y.o. female,Established patient, here for evaluation of the following chief complaint(s): Other (pinched nerve)         ASSESSMENT/PLAN:  1. Sciatica of left side  -     gabapentin (NEURONTIN) 100 MG capsule; Take 1 capsule by mouth 3 times daily for 180 days. Intended supply: 90 days, Disp-90 capsule, R-0Normal    This is both an acute and a chronic problem. She will start on a gabapentin 100 mg capsule 1 capsule 3 times a day and see how she tolerates this. No follow-ups on file. Subjective   SUBJECTIVE/OBJECTIVE:  Esha Padilla is a 40 y.o. female. Patient presents with: Other: pinched nerve      43-year-old female who has had back pain with pain going down into her left hip and left leg. This been getting worse over time. Patient notes that it is quite painful. She is trying to to do everything as far as exercises and stretching and not getting better. She notes that she is in the process of getting this worked up but has pain that does not allow her to function or work at this time. She notices some numbness or tingling in her foot at times. The patients PMH, surgical history, family history, medications, allergies were all reviewed and updated as appropriate today. Review of Systems       Objective   Physical Exam  Vitals and nursing note reviewed. Constitutional:       Appearance: Normal appearance. She is well-developed. HENT:      Head: Normocephalic and atraumatic. Right Ear: External ear normal.      Left Ear: External ear normal.      Nose: Nose normal.   Eyes:      Conjunctiva/sclera: Conjunctivae normal.      Pupils: Pupils are equal, round, and reactive to light. Cardiovascular:      Rate and Rhythm: Normal rate and regular rhythm. Heart sounds: Normal heart sounds. Pulmonary:      Effort: Pulmonary effort is normal.      Breath sounds: Normal breath sounds.    Abdominal:      General: Bowel sounds are normal. Palpations: Abdomen is soft. Musculoskeletal:         General: Normal range of motion. Cervical back: Normal range of motion and neck supple. Skin:     General: Skin is dry. Neurological:      Mental Status: She is alert and oriented to person, place, and time. Deep Tendon Reflexes: Reflexes are normal and symmetric. Comments: Left leg straight leg raise positive. Psychiatric:         Behavior: Behavior normal.         Thought Content: Thought content normal.         Judgment: Judgment normal.              An electronic signature was used to authenticate this note.     --Billy Foley MD

## 2022-06-22 DIAGNOSIS — M54.2 NECK PAIN: ICD-10-CM

## 2022-06-23 RX ORDER — IBUPROFEN 800 MG/1
TABLET ORAL
Qty: 60 TABLET | Refills: 0 | Status: SHIPPED | OUTPATIENT
Start: 2022-06-23 | End: 2022-08-16

## 2022-08-16 DIAGNOSIS — M54.2 NECK PAIN: ICD-10-CM

## 2022-08-16 RX ORDER — IBUPROFEN 800 MG/1
TABLET ORAL
Qty: 60 TABLET | Refills: 0 | Status: SHIPPED | OUTPATIENT
Start: 2022-08-16 | End: 2022-09-26

## 2022-09-08 DIAGNOSIS — E11.9 TYPE 2 DIABETES MELLITUS WITHOUT COMPLICATION, WITHOUT LONG-TERM CURRENT USE OF INSULIN (HCC): ICD-10-CM

## 2022-09-08 NOTE — TELEPHONE ENCOUNTER
Last Office Visit  -  5/26/22  Next Office Visit  -  9/19/22    Last Filled  -    Last UDS -    Contract -

## 2022-09-24 DIAGNOSIS — M54.2 NECK PAIN: ICD-10-CM

## 2022-09-25 DIAGNOSIS — G44.209 TENSION HEADACHE: ICD-10-CM

## 2022-09-26 ENCOUNTER — PATIENT MESSAGE (OUTPATIENT)
Dept: FAMILY MEDICINE CLINIC | Age: 42
End: 2022-09-26

## 2022-09-26 DIAGNOSIS — M54.32 SCIATICA OF LEFT SIDE: ICD-10-CM

## 2022-09-26 RX ORDER — CYCLOBENZAPRINE HCL 10 MG
10 TABLET ORAL 3 TIMES DAILY PRN
Qty: 30 TABLET | Refills: 1 | Status: SHIPPED | OUTPATIENT
Start: 2022-09-26

## 2022-09-26 RX ORDER — HYDROCODONE BITARTRATE AND ACETAMINOPHEN 7.5; 325 MG/1; MG/1
1 TABLET ORAL EVERY 6 HOURS PRN
Qty: 28 TABLET | Refills: 0 | Status: SHIPPED | OUTPATIENT
Start: 2022-09-26 | End: 2022-10-03

## 2022-09-26 RX ORDER — IBUPROFEN 800 MG/1
TABLET ORAL
Qty: 60 TABLET | Refills: 0 | Status: SHIPPED | OUTPATIENT
Start: 2022-09-26

## 2022-09-26 NOTE — TELEPHONE ENCOUNTER
Last Office Visit  -  5/26/22  Next Office Visit  -  10/13/22    Last Filled  -  6/2/22  Last UDS -  n/a  Contract -  n/a

## 2022-09-26 NOTE — TELEPHONE ENCOUNTER
From: Farnaz Pepe  To: Dr. Chhaya Berman: 2022 7:39 AM EDT  Subject: Pinched nerve     Pinched nerve has been acting up last few days . Was wondering if could get a refill on the low dose generic Vicodin I had last time the ibuprofen isnt helping much . Been fine last few months must of just aggravated it .  Thank you Nicole Gómez

## 2022-10-08 DIAGNOSIS — E11.9 TYPE 2 DIABETES MELLITUS WITHOUT COMPLICATION, WITHOUT LONG-TERM CURRENT USE OF INSULIN (HCC): ICD-10-CM

## 2022-10-10 RX ORDER — PIOGLITAZONEHYDROCHLORIDE 15 MG/1
15 TABLET ORAL DAILY
Qty: 30 TABLET | Refills: 3 | Status: SHIPPED | OUTPATIENT
Start: 2022-10-10

## 2022-10-25 ENCOUNTER — TELEMEDICINE (OUTPATIENT)
Dept: FAMILY MEDICINE CLINIC | Age: 42
End: 2022-10-25
Payer: COMMERCIAL

## 2022-10-25 DIAGNOSIS — J01.90 ACUTE BACTERIAL SINUSITIS: ICD-10-CM

## 2022-10-25 DIAGNOSIS — B96.89 ACUTE BACTERIAL SINUSITIS: ICD-10-CM

## 2022-10-25 PROCEDURE — G8427 DOCREV CUR MEDS BY ELIG CLIN: HCPCS | Performed by: FAMILY MEDICINE

## 2022-10-25 PROCEDURE — G8484 FLU IMMUNIZE NO ADMIN: HCPCS | Performed by: FAMILY MEDICINE

## 2022-10-25 PROCEDURE — 99213 OFFICE O/P EST LOW 20 MIN: CPT | Performed by: FAMILY MEDICINE

## 2022-10-25 PROCEDURE — 1036F TOBACCO NON-USER: CPT | Performed by: FAMILY MEDICINE

## 2022-10-25 PROCEDURE — G8417 CALC BMI ABV UP PARAM F/U: HCPCS | Performed by: FAMILY MEDICINE

## 2022-10-25 RX ORDER — AZITHROMYCIN 250 MG/1
250 TABLET, FILM COATED ORAL SEE ADMIN INSTRUCTIONS
Qty: 6 TABLET | Refills: 0 | Status: SHIPPED | OUTPATIENT
Start: 2022-10-25 | End: 2022-10-30

## 2022-10-25 ASSESSMENT — ENCOUNTER SYMPTOMS: COUGH: 1

## 2022-10-25 NOTE — PROGRESS NOTES
María Rincon (:  1980) is a Established patient, here for evaluation of the following:    Assessment & Plan   Below is the assessment and plan developed based on review of pertinent history, physical exam, labs, studies, and medications. 1. Acute bacterial sinusitis  -     azithromycin (ZITHROMAX) 250 MG tablet; Take 1 tablet by mouth See Admin Instructions for 5 days 500mg on day 1 followed by 250mg on days 2 - 5, Disp-6 tablet, R-0Normal  No follow-ups on file. Subjective   María Rincon is a 40 y.o. female. Patient presents with:  Cough  Otalgia      Cough, ear pain, draining. Has been feeling some myalgia. Has itchiness in back of throat. Gets fluid in ears. Patient notes this has been going on for over a week. Cough at times is causing her to not be able to talk. Patient notes that it has not been very productive. She has had a lot of sinus drainage. The patients PMH, surgical history, family history, medications, allergies were all reviewed and updated as appropriate today. Cough  Associated symptoms include ear pain. Otalgia   Associated symptoms include coughing. Review of Systems   HENT:  Positive for ear pain. Respiratory:  Positive for cough.          Objective   Patient-Reported Vitals  No data recorded     Physical Exam  [INSTRUCTIONS:  \"[x]\" Indicates a positive item  \"[]\" Indicates a negative item  -- DELETE ALL ITEMS NOT EXAMINED]    Constitutional: [x] Appears well-developed and well-nourished [x] No apparent distress      [] Abnormal -     Mental status: [x] Alert and awake  [x] Oriented to person/place/time [x] Able to follow commands    [] Abnormal -     Eyes:   EOM    [x]  Normal    [] Abnormal -   Sclera  [x]  Normal    [] Abnormal -          Discharge [x]  None visible   [] Abnormal -     HENT: [x] Normocephalic, atraumatic  [] Abnormal -   [x] Mouth/Throat: Mucous membranes are moist    External Ears [x] Normal  [] Abnormal -    Neck: [x] No visualized mass [] Abnormal -     Pulmonary/Chest: [x] Respiratory effort normal   [x] No visualized signs of difficulty breathing or respiratory distress        [] Abnormal -      Musculoskeletal:   [x] Normal gait with no signs of ataxia         [x] Normal range of motion of neck        [] Abnormal -     Neurological:        [x] No Facial Asymmetry (Cranial nerve 7 motor function) (limited exam due to video visit)          [x] No gaze palsy        [] Abnormal -          Skin:        [x] No significant exanthematous lesions or discoloration noted on facial skin         [] Abnormal -            Psychiatric:       [x] Normal Affect [] Abnormal -        [x] No Hallucinations    Other pertinent observable physical exam findings:-         Bassam Spear, was evaluated through a synchronous (real-time) audio-video encounter. The patient (or guardian if applicable) is aware that this is a billable service, which includes applicable co-pays. This Virtual Visit was conducted with patient's (and/or legal guardian's) consent. The visit was conducted pursuant to the emergency declaration under the [address] [place] [address], [address] [place] [address] authority and the [employer] Act. Patient identification was verified, and a caregiver was present when appropriate. The patient was located at Home: [place] [id number].    Provider was located at [facility] ([facility] Dept): [address],  [id number] Micah Conde MD

## 2022-11-12 DIAGNOSIS — G25.81 RLS (RESTLESS LEGS SYNDROME): ICD-10-CM

## 2022-11-14 RX ORDER — ROPINIROLE 0.25 MG/1
TABLET, FILM COATED ORAL
Qty: 90 TABLET | Refills: 3 | Status: SHIPPED | OUTPATIENT
Start: 2022-11-14

## 2022-11-17 ENCOUNTER — OFFICE VISIT (OUTPATIENT)
Dept: FAMILY MEDICINE CLINIC | Age: 42
End: 2022-11-17
Payer: COMMERCIAL

## 2022-11-17 VITALS
WEIGHT: 265 LBS | OXYGEN SATURATION: 98 % | BODY MASS INDEX: 39.25 KG/M2 | HEIGHT: 69 IN | HEART RATE: 88 BPM | SYSTOLIC BLOOD PRESSURE: 114 MMHG | DIASTOLIC BLOOD PRESSURE: 76 MMHG

## 2022-11-17 DIAGNOSIS — R68.89 COLD INTOLERANCE: ICD-10-CM

## 2022-11-17 DIAGNOSIS — E11.9 TYPE 2 DIABETES MELLITUS WITHOUT COMPLICATION, WITHOUT LONG-TERM CURRENT USE OF INSULIN (HCC): ICD-10-CM

## 2022-11-17 DIAGNOSIS — F32.A ANXIETY AND DEPRESSION: ICD-10-CM

## 2022-11-17 DIAGNOSIS — F41.9 ANXIETY AND DEPRESSION: ICD-10-CM

## 2022-11-17 DIAGNOSIS — Z23 NEED FOR PROPHYLACTIC VACCINATION AND INOCULATION AGAINST INFLUENZA: Primary | ICD-10-CM

## 2022-11-17 LAB
CHOLESTEROL, FASTING: 148 MG/DL (ref 0–199)
HBA1C MFR BLD: 7.4 %
HDLC SERPL-MCNC: 45 MG/DL (ref 40–60)
LDL CHOLESTEROL CALCULATED: 82 MG/DL
TRIGLYCERIDE, FASTING: 105 MG/DL (ref 0–150)
TSH REFLEX: 3.67 UIU/ML (ref 0.27–4.2)
VLDLC SERPL CALC-MCNC: 21 MG/DL

## 2022-11-17 PROCEDURE — 2022F DILAT RTA XM EVC RTNOPTHY: CPT | Performed by: FAMILY MEDICINE

## 2022-11-17 PROCEDURE — 90471 IMMUNIZATION ADMIN: CPT | Performed by: FAMILY MEDICINE

## 2022-11-17 PROCEDURE — G8417 CALC BMI ABV UP PARAM F/U: HCPCS | Performed by: FAMILY MEDICINE

## 2022-11-17 PROCEDURE — 99214 OFFICE O/P EST MOD 30 MIN: CPT | Performed by: FAMILY MEDICINE

## 2022-11-17 PROCEDURE — G8482 FLU IMMUNIZE ORDER/ADMIN: HCPCS | Performed by: FAMILY MEDICINE

## 2022-11-17 PROCEDURE — 90674 CCIIV4 VAC NO PRSV 0.5 ML IM: CPT | Performed by: FAMILY MEDICINE

## 2022-11-17 PROCEDURE — G8427 DOCREV CUR MEDS BY ELIG CLIN: HCPCS | Performed by: FAMILY MEDICINE

## 2022-11-17 PROCEDURE — 3051F HG A1C>EQUAL 7.0%<8.0%: CPT | Performed by: FAMILY MEDICINE

## 2022-11-17 PROCEDURE — 83036 HEMOGLOBIN GLYCOSYLATED A1C: CPT | Performed by: FAMILY MEDICINE

## 2022-11-17 PROCEDURE — 1036F TOBACCO NON-USER: CPT | Performed by: FAMILY MEDICINE

## 2022-11-17 RX ORDER — VENLAFAXINE HYDROCHLORIDE 150 MG/1
150 CAPSULE, EXTENDED RELEASE ORAL DAILY
Qty: 30 CAPSULE | Refills: 5 | Status: SHIPPED | OUTPATIENT
Start: 2022-11-17

## 2022-11-17 RX ORDER — SEMAGLUTIDE 2.68 MG/ML
2 INJECTION, SOLUTION SUBCUTANEOUS WEEKLY
Qty: 3 ML | Refills: 5 | Status: SHIPPED | OUTPATIENT
Start: 2022-11-17

## 2022-11-17 NOTE — PROGRESS NOTES
Abi Smith (:  1980) is a 40 y.o. female,Established patient, here for evaluation of the following chief complaint(s):  Diabetes         ASSESSMENT/PLAN:  1. Need for prophylactic vaccination and inoculation against influenza  -     Influenza, FLUCELVAX, (age 10 mo+), IM, Preservative Free, 0.5 mL  2. Type 2 diabetes mellitus without complication, without long-term current use of insulin ([facility])  -     POCT glycosylated hemoglobin (Hb A1C)  -     Semaglutide, 2 MG/DOSE, (OZEMPIC, 2 MG/DOSE,) 8 MG/3ML SOPN; Inject 2 mg into the skin once a week, Disp-3 mL, R-5Normal  -     Lipid, Fasting; Future  Hemoglobin A1C   Date Value Ref Range Status   2022 7.4 % Final     Fair control at this time. Increase Ozempic  3. Anxiety and depression  -     venlafaxine (EFFEXOR XR) 150 MG extended release capsule; Take 1 capsule by mouth daily, Disp-30 capsule, R-5Normal  4. Cold intolerance  -     TSH with Reflex; Future      No follow-ups on file. Subjective   SUBJECTIVE/OBJECTIVE:  Abi Smith is a 40 y.o. female. Patient presents with:  Diabetes      81-year-old female presents with the following concerns    1. Diabetes mellitus. For the most part this is been well controlled recently. She has been taking her medicines regularly. Denies any significant high or low blood sugars. Denies polyuria polyphagia or polydipsia. 2.  Patient has having increasing anxiety recently. He is currently on Effexor but was wondering if it is not working as well as it used to. Patient notes that she feels that she is getting more anxious and having a harder time dealing with her stress recently. She notes that she feels anxious all the time. 3.  Patient has been having cold intolerance recently. Patient notes that she feels cold all the time. She does feel warm all the time. Now she has this is which for her. She denies any weight loss or changes in her weight recently. No energy changes.   The patients PMH, surgical history, family history, medications, allergies were all reviewed and updated as appropriate today. Review of Systems       Objective   Physical Exam  Vitals and nursing note reviewed. Constitutional:       Appearance: Normal appearance. She is well-developed. HENT:      Head: Normocephalic and atraumatic. Right Ear: External ear normal.      Left Ear: External ear normal.      Nose: Nose normal.   Eyes:      Conjunctiva/sclera: Conjunctivae normal.      Pupils: Pupils are equal, round, and reactive to light. Cardiovascular:      Rate and Rhythm: Normal rate and regular rhythm. Heart sounds: Normal heart sounds. Pulmonary:      Effort: Pulmonary effort is normal.      Breath sounds: Normal breath sounds. Abdominal:      General: Bowel sounds are normal.      Palpations: Abdomen is soft. Musculoskeletal:         General: Normal range of motion. Cervical back: Normal range of motion and neck supple. Skin:     General: Skin is dry. Neurological:      Mental Status: She is alert and oriented to person, place, and time. Deep Tendon Reflexes: Reflexes are normal and symmetric. Psychiatric:         Behavior: Behavior normal.         Thought Content: Thought content normal.         Judgment: Judgment normal.              An electronic signature was used to authenticate this note.     --Jojo Elkins MD

## 2022-11-30 DIAGNOSIS — M54.2 NECK PAIN: ICD-10-CM

## 2022-11-30 RX ORDER — IBUPROFEN 800 MG/1
TABLET ORAL
Qty: 60 TABLET | Refills: 0 | Status: SHIPPED | OUTPATIENT
Start: 2022-11-30

## 2022-11-30 NOTE — TELEPHONE ENCOUNTER
Last Office Visit  -  11/17/22  Next Office Visit  -  n/a    Last Filled  -  9/26/22  Last UDS -    Contract -

## 2023-01-02 DIAGNOSIS — E11.9 TYPE 2 DIABETES MELLITUS WITHOUT COMPLICATION, WITHOUT LONG-TERM CURRENT USE OF INSULIN (HCC): ICD-10-CM

## 2023-01-02 RX ORDER — SEMAGLUTIDE 1.34 MG/ML
INJECTION, SOLUTION SUBCUTANEOUS
Qty: 9 ML | Refills: 1 | OUTPATIENT
Start: 2023-01-02

## 2023-01-03 DIAGNOSIS — M54.2 NECK PAIN: ICD-10-CM

## 2023-01-04 RX ORDER — IBUPROFEN 800 MG/1
TABLET ORAL
Qty: 60 TABLET | Refills: 0 | Status: SHIPPED | OUTPATIENT
Start: 2023-01-04

## 2023-03-15 DIAGNOSIS — M54.2 NECK PAIN: ICD-10-CM

## 2023-03-15 RX ORDER — IBUPROFEN 800 MG/1
TABLET ORAL
Qty: 60 TABLET | Refills: 0 | Status: SHIPPED | OUTPATIENT
Start: 2023-03-15

## 2023-03-15 NOTE — TELEPHONE ENCOUNTER
Last Office Visit  -  11/17/22  Next Office Visit  -  n/a    Last Filled  -    Last UDS -    Contract -

## 2023-03-19 DIAGNOSIS — A60.9 HSV (HERPES SIMPLEX VIRUS) ANOGENITAL INFECTION: ICD-10-CM

## 2023-03-20 RX ORDER — ACYCLOVIR 800 MG/1
800 TABLET ORAL DAILY
Qty: 30 TABLET | Refills: 11 | Status: SHIPPED | OUTPATIENT
Start: 2023-03-20

## 2023-04-16 DIAGNOSIS — M54.2 NECK PAIN: ICD-10-CM

## 2023-04-17 RX ORDER — IBUPROFEN 800 MG/1
TABLET ORAL
Qty: 60 TABLET | Refills: 0 | Status: SHIPPED | OUTPATIENT
Start: 2023-04-17

## 2023-04-26 ENCOUNTER — PATIENT MESSAGE (OUTPATIENT)
Dept: FAMILY MEDICINE CLINIC | Age: 43
End: 2023-04-26

## 2023-04-26 DIAGNOSIS — G44.209 TENSION HEADACHE: Primary | ICD-10-CM

## 2023-04-26 DIAGNOSIS — G44.209 TENSION HEADACHE: ICD-10-CM

## 2023-04-26 RX ORDER — BUTALBITAL, ACETAMINOPHEN AND CAFFEINE 50; 325; 40 MG/1; MG/1; MG/1
1 TABLET ORAL EVERY 6 HOURS PRN
Qty: 20 TABLET | Refills: 0 | Status: SHIPPED | OUTPATIENT
Start: 2023-04-26

## 2023-04-26 RX ORDER — CYCLOBENZAPRINE HCL 10 MG
TABLET ORAL
Qty: 30 TABLET | Refills: 1 | Status: SHIPPED | OUTPATIENT
Start: 2023-04-26

## 2023-04-26 NOTE — TELEPHONE ENCOUNTER
From: Soha Pepe  To: Dr. Ally Bill: 4/26/2023 1:07 PM EDT  Subject: Tension headaches     Not been having a issues with my tension headaches for a long time but seems to be back. Feel it all in my shoulders ,jaw,neck and head . I was wondering if could possibly get a refill on the Butalbital that you prescribed for it from last time . It helped a lot . Also a doctors note for today date . Cant get rid of the headache and shoulder pain even with ibuprofen. Thank you. And side note feeling much better since we adjusted the anxiety/depression meds .

## 2023-04-26 NOTE — TELEPHONE ENCOUNTER
Last Office Visit  -  03/23/2023  Next Office Visit  -  n/a    Last Filled  -    Last UDS -    Contract -

## 2023-06-08 ENCOUNTER — OFFICE VISIT (OUTPATIENT)
Dept: OBGYN CLINIC | Age: 43
End: 2023-06-08
Payer: COMMERCIAL

## 2023-06-08 VITALS
TEMPERATURE: 98 F | DIASTOLIC BLOOD PRESSURE: 84 MMHG | SYSTOLIC BLOOD PRESSURE: 122 MMHG | WEIGHT: 251 LBS | HEART RATE: 100 BPM | BODY MASS INDEX: 37.18 KG/M2 | HEIGHT: 69 IN

## 2023-06-08 DIAGNOSIS — Z12.4 PAP SMEAR FOR CERVICAL CANCER SCREENING: ICD-10-CM

## 2023-06-08 DIAGNOSIS — E66.01 CLASS 3 SEVERE OBESITY DUE TO EXCESS CALORIES WITHOUT SERIOUS COMORBIDITY WITH BODY MASS INDEX (BMI) OF 40.0 TO 44.9 IN ADULT (HCC): ICD-10-CM

## 2023-06-08 DIAGNOSIS — D68.51 HETEROZYGOUS FACTOR V LEIDEN MUTATION (HCC): ICD-10-CM

## 2023-06-08 DIAGNOSIS — Z20.2 POSSIBLE EXPOSURE TO STD: ICD-10-CM

## 2023-06-08 DIAGNOSIS — Z01.419 WOMEN'S ANNUAL ROUTINE GYNECOLOGICAL EXAMINATION: Primary | ICD-10-CM

## 2023-06-08 DIAGNOSIS — E11.9 TYPE 2 DIABETES MELLITUS WITHOUT COMPLICATION, WITHOUT LONG-TERM CURRENT USE OF INSULIN (HCC): ICD-10-CM

## 2023-06-08 DIAGNOSIS — Z86.19 HX OF HERPES GENITALIS: ICD-10-CM

## 2023-06-08 DIAGNOSIS — Z12.39 SCREENING BREAST EXAMINATION: ICD-10-CM

## 2023-06-08 DIAGNOSIS — Z86.718 HX OF CEREBRAL VENOUS SINUS THROMBOSIS: ICD-10-CM

## 2023-06-08 DIAGNOSIS — Z97.5 IUD (INTRAUTERINE DEVICE) IN PLACE: ICD-10-CM

## 2023-06-08 DIAGNOSIS — Z12.31 BREAST CANCER SCREENING BY MAMMOGRAM: ICD-10-CM

## 2023-06-08 PROCEDURE — 99396 PREV VISIT EST AGE 40-64: CPT | Performed by: OBSTETRICS & GYNECOLOGY

## 2023-06-08 PROCEDURE — 36415 COLL VENOUS BLD VENIPUNCTURE: CPT | Performed by: OBSTETRICS & GYNECOLOGY

## 2023-06-08 RX ORDER — ROSUVASTATIN CALCIUM 5 MG/1
TABLET, COATED ORAL
COMMUNITY

## 2023-06-08 RX ORDER — BUSPIRONE HYDROCHLORIDE 7.5 MG/1
TABLET ORAL
COMMUNITY

## 2023-06-08 RX ORDER — HYDROCODONE BITARTRATE AND ACETAMINOPHEN 7.5; 325 MG/1; MG/1
TABLET ORAL
COMMUNITY

## 2023-06-08 ASSESSMENT — ENCOUNTER SYMPTOMS
NAUSEA: 0
CHEST TIGHTNESS: 0
BACK PAIN: 0
WHEEZING: 0
COLOR CHANGE: 0
ABDOMINAL PAIN: 0
VOMITING: 0
DIARRHEA: 0
SHORTNESS OF BREATH: 0
SORE THROAT: 0
ROS SKIN COMMENTS: ABNORMAL HAIR GROWTH

## 2023-06-08 NOTE — PROGRESS NOTES
[facility] Ob[facility]  Annual Exam    CC:   Chief Complaint   Patient presents with    Annual Exam       HPI: Danette Banda is a 44 y.o. female who presents for annual exam.   Pt is doing well today without complaints. No changes to medical history. Other medical problems at baseline. Pt has had a Hysteroscopy D and C on 12/19/19 due to endometrial polyps. Has had a hx of cerebral venous thrombosis due to Diego use. She is heterozygous for factor V leiden. 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 Review of Systems:   Review of Systems   Constitutional:  Negative for appetite change, chills and fever. HENT:  Negative for congestion, sneezing and sore throat. Eyes:  Negative for visual disturbance. Respiratory:  Negative for chest tightness, shortness of breath and wheezing. Cardiovascular:  Negative for chest pain, palpitations and leg swelling. Gastrointestinal:  Negative for abdominal pain, diarrhea, nausea and vomiting. Endocrine: Negative for heat intolerance. Genitourinary:  Negative for difficulty urinating, dyspareunia, dysuria, menstrual problem (doing well with IUD), pelvic pain, vaginal bleeding and vaginal discharge. Musculoskeletal:  Negative for arthralgias, back pain, neck pain and neck stiffness. Skin:  Negative for color change, pallor and rash.         Abnormal hair growth   Allergic/Immunologic: Negative for environmental allergies,

## 2023-06-09 LAB
C TRACH DNA CVX QL NAA+PROBE: NEGATIVE
CANDIDA DNA VAG QL NAA+PROBE: NORMAL
G VAGINALIS DNA SPEC QL NAA+PROBE: NORMAL
HCV AB SERPL QL IA: NORMAL
HIV 1+2 AB+HIV1 P24 AG SERPL QL IA: NORMAL
HIV 2 AB SERPL QL IA: NORMAL
HIV1 AB SERPL QL IA: NORMAL
HIV1 P24 AG SERPL QL IA: NORMAL
HPV HR 12 DNA SPEC QL NAA+PROBE: NOT DETECTED
HPV16 DNA SPEC QL NAA+PROBE: NOT DETECTED
HPV16+18+H RISK 12 DNA SPEC-IMP: NORMAL
HPV18 DNA SPEC QL NAA+PROBE: NOT DETECTED
N GONORRHOEA DNA CERV MUCUS QL NAA+PROBE: NEGATIVE
REAGIN+T PALLIDUM IGG+IGM SERPL-IMP: NORMAL
T VAGINALIS DNA VAG QL NAA+PROBE: NORMAL

## 2023-07-13 DIAGNOSIS — E11.9 TYPE 2 DIABETES MELLITUS WITHOUT COMPLICATION, WITHOUT LONG-TERM CURRENT USE OF INSULIN (HCC): ICD-10-CM

## 2023-07-13 RX ORDER — SEMAGLUTIDE 2.68 MG/ML
2 INJECTION, SOLUTION SUBCUTANEOUS WEEKLY
Qty: 3 ML | Refills: 1 | Status: SHIPPED | OUTPATIENT
Start: 2023-07-13

## 2023-07-22 DIAGNOSIS — N92.6 MENSTRUAL IRREGULARITY: ICD-10-CM

## 2023-07-22 DIAGNOSIS — M54.2 NECK PAIN: ICD-10-CM

## 2023-07-24 RX ORDER — SPIRONOLACTONE 25 MG/1
25 TABLET ORAL DAILY
Qty: 30 TABLET | Refills: 5 | Status: SHIPPED | OUTPATIENT
Start: 2023-07-24

## 2023-07-24 RX ORDER — IBUPROFEN 800 MG/1
TABLET ORAL
Qty: 60 TABLET | Refills: 0 | Status: SHIPPED | OUTPATIENT
Start: 2023-07-24

## 2023-07-28 ENCOUNTER — PATIENT MESSAGE (OUTPATIENT)
Dept: FAMILY MEDICINE CLINIC | Age: 43
End: 2023-07-28

## 2023-09-05 DIAGNOSIS — M54.2 NECK PAIN: ICD-10-CM

## 2023-09-06 RX ORDER — IBUPROFEN 800 MG/1
TABLET ORAL
Qty: 60 TABLET | Refills: 0 | Status: SHIPPED | OUTPATIENT
Start: 2023-09-06

## 2023-09-06 NOTE — TELEPHONE ENCOUNTER
Last Office Visit  -  3/23/23  Next Office Visit  -  9/19/23    Last Filled  -    Last UDS -    Contract -

## 2023-09-12 ENCOUNTER — OFFICE VISIT (OUTPATIENT)
Dept: FAMILY MEDICINE CLINIC | Age: 43
End: 2023-09-12
Payer: COMMERCIAL

## 2023-09-12 VITALS
HEART RATE: 104 BPM | HEIGHT: 69 IN | BODY MASS INDEX: 37.62 KG/M2 | SYSTOLIC BLOOD PRESSURE: 132 MMHG | WEIGHT: 254 LBS | DIASTOLIC BLOOD PRESSURE: 82 MMHG | OXYGEN SATURATION: 93 %

## 2023-09-12 DIAGNOSIS — B96.89 ACUTE BACTERIAL SINUSITIS: ICD-10-CM

## 2023-09-12 DIAGNOSIS — J01.90 ACUTE BACTERIAL SINUSITIS: ICD-10-CM

## 2023-09-12 DIAGNOSIS — S16.1XXS STRAIN OF NECK MUSCLE, SEQUELA: ICD-10-CM

## 2023-09-12 DIAGNOSIS — E11.9 TYPE 2 DIABETES MELLITUS WITHOUT COMPLICATION, WITHOUT LONG-TERM CURRENT USE OF INSULIN (HCC): Primary | ICD-10-CM

## 2023-09-12 LAB — HBA1C MFR BLD: 7.3 %

## 2023-09-12 PROCEDURE — 83036 HEMOGLOBIN GLYCOSYLATED A1C: CPT | Performed by: FAMILY MEDICINE

## 2023-09-12 PROCEDURE — 2022F DILAT RTA XM EVC RTNOPTHY: CPT | Performed by: FAMILY MEDICINE

## 2023-09-12 PROCEDURE — 3051F HG A1C>EQUAL 7.0%<8.0%: CPT | Performed by: FAMILY MEDICINE

## 2023-09-12 PROCEDURE — G8427 DOCREV CUR MEDS BY ELIG CLIN: HCPCS | Performed by: FAMILY MEDICINE

## 2023-09-12 PROCEDURE — 1036F TOBACCO NON-USER: CPT | Performed by: FAMILY MEDICINE

## 2023-09-12 PROCEDURE — G8417 CALC BMI ABV UP PARAM F/U: HCPCS | Performed by: FAMILY MEDICINE

## 2023-09-12 PROCEDURE — 99214 OFFICE O/P EST MOD 30 MIN: CPT | Performed by: FAMILY MEDICINE

## 2023-09-12 RX ORDER — AZITHROMYCIN 250 MG/1
250 TABLET, FILM COATED ORAL SEE ADMIN INSTRUCTIONS
Qty: 6 TABLET | Refills: 0 | Status: SHIPPED | OUTPATIENT
Start: 2023-09-12 | End: 2023-09-17

## 2023-09-12 RX ORDER — METHOCARBAMOL 750 MG/1
750 TABLET, FILM COATED ORAL 4 TIMES DAILY
Qty: 40 TABLET | Refills: 0 | Status: SHIPPED | OUTPATIENT
Start: 2023-09-12 | End: 2023-09-22

## 2023-09-12 RX ORDER — METHYLPREDNISOLONE 4 MG/1
TABLET ORAL
Qty: 1 KIT | Refills: 0 | Status: SHIPPED | OUTPATIENT
Start: 2023-09-12

## 2023-09-12 SDOH — ECONOMIC STABILITY: FOOD INSECURITY: WITHIN THE PAST 12 MONTHS, YOU WORRIED THAT YOUR FOOD WOULD RUN OUT BEFORE YOU GOT MONEY TO BUY MORE.: NEVER TRUE

## 2023-09-12 SDOH — ECONOMIC STABILITY: INCOME INSECURITY: HOW HARD IS IT FOR YOU TO PAY FOR THE VERY BASICS LIKE FOOD, HOUSING, MEDICAL CARE, AND HEATING?: SOMEWHAT HARD

## 2023-09-12 SDOH — ECONOMIC STABILITY: FOOD INSECURITY: WITHIN THE PAST 12 MONTHS, THE FOOD YOU BOUGHT JUST DIDN'T LAST AND YOU DIDN'T HAVE MONEY TO GET MORE.: NEVER TRUE

## 2023-09-12 SDOH — ECONOMIC STABILITY: HOUSING INSECURITY
IN THE LAST 12 MONTHS, WAS THERE A TIME WHEN YOU DID NOT HAVE A STEADY PLACE TO SLEEP OR SLEPT IN A SHELTER (INCLUDING NOW)?: NO

## 2023-09-12 SDOH — ECONOMIC STABILITY: TRANSPORTATION INSECURITY
IN THE PAST 12 MONTHS, HAS LACK OF TRANSPORTATION KEPT YOU FROM MEETINGS, WORK, OR FROM GETTING THINGS NEEDED FOR DAILY LIVING?: NO

## 2023-09-12 NOTE — PROGRESS NOTES
Daniele Carnes (:  1980) is a 44 y.o. female,Established patient, here for evaluation of the following chief complaint(s):  Headache, Shoulder Pain, Neck Pain, and Diabetes         ASSESSMENT/PLAN:  1. Type 2 diabetes mellitus without complication, without long-term current use of insulin ([facility])  -     POCT glycosylated hemoglobin (Hb A1C)  Hemoglobin A1C   Date Value Ref Range Status   2023 7.3 % Final     Only fair control at this time for 50-year-old female. Patient will work on diet and exercise. No changes in medicines at this time. 2. Strain of neck muscle, sequela  -     methylPREDNISolone (MEDROL, GRECIA,) 4 MG tablet; Take by mouth as directed, Disp-1 kit, R-0Normal  -     methocarbamol (ROBAXIN-750) 750 MG tablet; Take 1 tablet by mouth 4 times daily for 10 days, Disp-40 tablet, R-0Normal  3. Acute bacterial sinusitis  -     azithromycin (ZITHROMAX) 250 MG tablet; Take 1 tablet by mouth See Admin Instructions for 5 days 500mg on day 1 followed by 250mg on days 2 - 5, Disp-6 tablet, R-0Normal      No follow-ups on file. Subjective   SUBJECTIVE/OBJECTIVE:  Daniele Carnes is a 44 y.o. female. Patient presents with:  Headache  Shoulder Pain  Neck Pain  Diabetes      1. Pressure in sinus area frontal.  This has been going on for approximately 10 days. It is worsened. She has had some rhinorrhea. No fevers chills or myalgia. Patient notes some ear pressure. No sore throat or cough at this time. 2.  Patient is having neck and shoulder pain. This is a recurrent problem for the patient. She tends to carry stress in her neck. Patient notes that does not radiate. Does cause occipital headaches at times. Patient notes stress levels been normal.  She has had no vision changes. No focal neurologic deficits. 3.  Diabetes mellitus. Patient's been taking her medications regularly and denies significant issues. Denies any significant hyper or hypoglycemic episodes.   No

## 2023-10-02 DIAGNOSIS — M54.2 NECK PAIN: ICD-10-CM

## 2023-10-02 RX ORDER — IBUPROFEN 800 MG/1
TABLET ORAL
Qty: 60 TABLET | Refills: 0 | Status: SHIPPED | OUTPATIENT
Start: 2023-10-02

## 2023-10-02 NOTE — TELEPHONE ENCOUNTER
Last Office Visit  -  9/12/23  Next Office Visit  -  NA    Last Filled  -  9/6/23  IBUPROFEN 800MG TABLETS

## 2023-10-10 DIAGNOSIS — F32.A ANXIETY AND DEPRESSION: ICD-10-CM

## 2023-10-10 DIAGNOSIS — F41.9 ANXIETY AND DEPRESSION: ICD-10-CM

## 2023-10-10 NOTE — TELEPHONE ENCOUNTER
Last Office Visit  -  09/12/2023  Next Office Visit  -  n/a    Last Filled  -    Last UDS -    Contract -

## 2023-10-11 RX ORDER — VENLAFAXINE HYDROCHLORIDE 75 MG/1
75 CAPSULE, EXTENDED RELEASE ORAL DAILY
Qty: 30 CAPSULE | Refills: 5 | Status: SHIPPED | OUTPATIENT
Start: 2023-10-11

## 2023-10-19 DIAGNOSIS — E11.9 TYPE 2 DIABETES MELLITUS WITHOUT COMPLICATION, WITHOUT LONG-TERM CURRENT USE OF INSULIN (HCC): ICD-10-CM

## 2023-10-19 RX ORDER — SEMAGLUTIDE 2.68 MG/ML
2 INJECTION, SOLUTION SUBCUTANEOUS WEEKLY
Qty: 3 ML | Refills: 1 | Status: SHIPPED | OUTPATIENT
Start: 2023-10-19

## 2023-11-03 ENCOUNTER — PATIENT MESSAGE (OUTPATIENT)
Dept: FAMILY MEDICINE CLINIC | Age: 43
End: 2023-11-03

## 2023-11-06 RX ORDER — TIRZEPATIDE 2.5 MG/.5ML
2.5 INJECTION, SOLUTION SUBCUTANEOUS WEEKLY
Qty: 2 ML | Refills: 0 | Status: SHIPPED | OUTPATIENT
Start: 2023-11-06

## 2023-11-14 DIAGNOSIS — M54.2 NECK PAIN: ICD-10-CM

## 2023-11-15 RX ORDER — IBUPROFEN 800 MG/1
TABLET ORAL
Qty: 60 TABLET | Refills: 0 | Status: SHIPPED | OUTPATIENT
Start: 2023-11-15

## 2023-12-01 RX ORDER — TIRZEPATIDE 2.5 MG/.5ML
INJECTION, SOLUTION SUBCUTANEOUS
Qty: 2 ML | Refills: 0 | Status: SHIPPED | OUTPATIENT
Start: 2023-12-01

## 2023-12-05 ENCOUNTER — OFFICE VISIT (OUTPATIENT)
Dept: FAMILY MEDICINE CLINIC | Age: 43
End: 2023-12-05

## 2023-12-05 VITALS
HEART RATE: 100 BPM | HEIGHT: 69 IN | SYSTOLIC BLOOD PRESSURE: 130 MMHG | OXYGEN SATURATION: 99 % | WEIGHT: 263 LBS | DIASTOLIC BLOOD PRESSURE: 88 MMHG | BODY MASS INDEX: 38.95 KG/M2

## 2023-12-05 DIAGNOSIS — F90.9 ATTENTION DEFICIT HYPERACTIVITY DISORDER (ADHD), UNSPECIFIED ADHD TYPE: ICD-10-CM

## 2023-12-05 DIAGNOSIS — E11.9 TYPE 2 DIABETES MELLITUS WITHOUT COMPLICATION, WITHOUT LONG-TERM CURRENT USE OF INSULIN (HCC): ICD-10-CM

## 2023-12-05 DIAGNOSIS — Z23 NEED FOR PROPHYLACTIC VACCINATION AND INOCULATION AGAINST INFLUENZA: Primary | ICD-10-CM

## 2023-12-05 LAB — HBA1C MFR BLD: 9.6 %

## 2023-12-05 RX ORDER — VILOXAZINE HYDROCHLORIDE 200 MG/1
200 CAPSULE, EXTENDED RELEASE ORAL
Qty: 30 CAPSULE | Refills: 5 | Status: SHIPPED | OUTPATIENT
Start: 2023-12-05

## 2023-12-05 RX ORDER — AMOXICILLIN AND CLAVULANATE POTASSIUM 875; 125 MG/1; MG/1
1 TABLET, FILM COATED ORAL 2 TIMES DAILY
Qty: 20 TABLET | Refills: 0 | Status: SHIPPED | OUTPATIENT
Start: 2023-12-05 | End: 2023-12-15

## 2023-12-26 ENCOUNTER — PATIENT MESSAGE (OUTPATIENT)
Dept: FAMILY MEDICINE CLINIC | Age: 43
End: 2023-12-26

## 2023-12-26 RX ORDER — TIRZEPATIDE 5 MG/.5ML
5 INJECTION, SOLUTION SUBCUTANEOUS WEEKLY
Qty: 2 ML | Refills: 0 | Status: SHIPPED | OUTPATIENT
Start: 2023-12-26

## 2023-12-26 NOTE — TELEPHONE ENCOUNTER
From: Suraj Pepe  To: Dr. Jazmin Crocker: 12/26/2023 8:31 AM EST  Subject: [facility]    Going to be using last of shot today . We talked about going up in dosage. So was going to see about getting that prescription in .  Thank you Emerson Denver

## 2024-01-10 DIAGNOSIS — M54.2 NECK PAIN: ICD-10-CM

## 2024-01-10 RX ORDER — IBUPROFEN 800 MG/1
TABLET ORAL
Qty: 60 TABLET | Refills: 0 | Status: SHIPPED | OUTPATIENT
Start: 2024-01-10

## 2024-01-10 NOTE — TELEPHONE ENCOUNTER
Last Office Visit  -  12/5/23  Next Office Visit  -  n/a    Last Filled  -    Last UDS -    Contract -

## 2024-01-18 RX ORDER — TIRZEPATIDE 2.5 MG/.5ML
INJECTION, SOLUTION SUBCUTANEOUS
Qty: 2 ML | Refills: 0 | Status: SHIPPED | OUTPATIENT
Start: 2024-01-18

## 2024-01-18 NOTE — TELEPHONE ENCOUNTER
Last Office Visit  -  12/05/2023  Next Office Visit  -  n/a    Last Filled  -    Last UDS -    Contract -

## 2024-02-09 ENCOUNTER — PATIENT MESSAGE (OUTPATIENT)
Dept: FAMILY MEDICINE CLINIC | Age: 44
End: 2024-02-09

## 2024-02-09 RX ORDER — TIRZEPATIDE 5 MG/.5ML
5 INJECTION, SOLUTION SUBCUTANEOUS WEEKLY
Qty: 2 ML | Refills: 0 | Status: SHIPPED | OUTPATIENT
Start: 2024-02-09

## 2024-02-09 NOTE — TELEPHONE ENCOUNTER
From: Miguel A Pepe  To: Dr. James Peace  Sent: 2/9/2024 5:12 AM EST  Subject: Mounjaro    Be almost due for refill . Should I go up in the Mounjaro been at the I think 2.5 for two months. Thanks just let me know Gary

## 2024-02-13 RX ORDER — TIRZEPATIDE 7.5 MG/.5ML
7.5 INJECTION, SOLUTION SUBCUTANEOUS WEEKLY
Qty: 2 ML | Refills: 1 | Status: SHIPPED | OUTPATIENT
Start: 2024-02-13

## 2024-02-23 DIAGNOSIS — M54.2 NECK PAIN: ICD-10-CM

## 2024-02-23 RX ORDER — IBUPROFEN 800 MG/1
TABLET ORAL
Qty: 60 TABLET | Refills: 0 | Status: SHIPPED | OUTPATIENT
Start: 2024-02-23

## 2024-02-26 ENCOUNTER — PATIENT MESSAGE (OUTPATIENT)
Dept: FAMILY MEDICINE CLINIC | Age: 44
End: 2024-02-26

## 2024-02-26 DIAGNOSIS — M54.32 SCIATICA OF LEFT SIDE: ICD-10-CM

## 2024-02-26 DIAGNOSIS — G44.209 TENSION HEADACHE: ICD-10-CM

## 2024-02-26 RX ORDER — CYCLOBENZAPRINE HCL 10 MG
TABLET ORAL
Qty: 30 TABLET | Refills: 1 | Status: SHIPPED | OUTPATIENT
Start: 2024-02-26

## 2024-02-26 RX ORDER — HYDROCODONE BITARTRATE AND ACETAMINOPHEN 7.5; 325 MG/1; MG/1
1 TABLET ORAL EVERY 6 HOURS PRN
Qty: 20 TABLET | Refills: 0 | Status: SHIPPED | OUTPATIENT
Start: 2024-02-26 | End: 2024-03-02

## 2024-02-26 NOTE — TELEPHONE ENCOUNTER
From: Miguel A Pepe  To: Dr. James ROSADOAlda  Sent: 2/26/2024 1:48 PM EST  Subject: Back pain     Pinched nerve has been acting up lately sometimes . Was wondering if could get a refill on the low dose generic Vicodin I had last time the ibuprofen isn’t helping much . Been off and on hurting . Boot camp probably doesn’t help it lol Might just be aggravating it again. Just ran out of last prescription that I got filled back in December only take it if ibuprofen doesn’t work when needed. Thanks Miguel A

## 2024-02-26 NOTE — TELEPHONE ENCOUNTER
Last Office Visit  -  12/5/23  Next Office Visit  -  n/a    Last Filled  -  4/26/23  Last UDS -    Contract -

## 2024-03-07 ENCOUNTER — TELEPHONE (OUTPATIENT)
Dept: ADMINISTRATIVE | Age: 44
End: 2024-03-07

## 2024-03-07 NOTE — TELEPHONE ENCOUNTER
I am processing PA for Qelbree 200MG er capsules. Key: [id number]. Plan is asking-     Please document name of the methylphenidate class stimulant medication:   Please document date of trial:     Please document name of the amphetamine class stimulant medication:   Please document date of trial:     I saw in chart notes that she had tried medications in the past. Do we know what she tried and approximately when?    Is the patient unable to swallow a solid dosage form (in other words, an oral tablet or capsule) due to age, oral/motor difficulties, or dysphagia?  Yes   No    Does the patient have a history of failure, contraindication, or intolerance to BOTH of the following?(1) an alpha-2 adrenergic agonist (for example, clonidine extended-release, guanfacine extended-release), (2) atomoxetine (generic Strattera)  Yes   No    Please advise. Thank you.

## 2024-03-13 NOTE — TELEPHONE ENCOUNTER
The answers to the last two questions are NO and NO    I don't know dates and lengths, but she has tried Amphetamine and Methylphenidate in past and could not tolerate side effects.

## 2024-03-14 NOTE — TELEPHONE ENCOUNTER
Submitted PA for Qelbree 200MG er capsules  Via [facility] Key: [id number]  STATUS: PENDING.    Follow up done daily; if no decision with in three days we will refax.  If another three days goes by with no decision will call the insurance for status.

## 2024-04-15 DIAGNOSIS — M54.2 NECK PAIN: ICD-10-CM

## 2024-04-15 RX ORDER — IBUPROFEN 800 MG/1
TABLET ORAL
Qty: 60 TABLET | Refills: 0 | Status: SHIPPED | OUTPATIENT
Start: 2024-04-15

## 2024-04-15 NOTE — TELEPHONE ENCOUNTER
Last Office Visit  -  12/5/23  Next Office Visit  -  n/a    Last Filled  -  2/23/24  Last UDS -    Contract -

## 2024-04-20 DIAGNOSIS — G25.81 RLS (RESTLESS LEGS SYNDROME): ICD-10-CM

## 2024-04-22 RX ORDER — ROPINIROLE 0.25 MG/1
TABLET, FILM COATED ORAL
Qty: 90 TABLET | Refills: 3 | Status: SHIPPED | OUTPATIENT
Start: 2024-04-22

## 2024-04-23 ENCOUNTER — PATIENT MESSAGE (OUTPATIENT)
Dept: FAMILY MEDICINE CLINIC | Age: 44
End: 2024-04-23

## 2024-04-23 RX ORDER — TIRZEPATIDE 5 MG/.5ML
5 INJECTION, SOLUTION SUBCUTANEOUS WEEKLY
Qty: 2 ML | Refills: 0 | Status: SHIPPED | OUTPATIENT
Start: 2024-04-23

## 2024-04-23 NOTE — TELEPHONE ENCOUNTER
From: Miguel A Pepe  To: Dr. James ROSADOAlda  Sent: 4/23/2024 9:10 AM EDT  Subject: Mounjaro     Seeing if can put new prescription in for the 5mg . Maybe be able to get it filled by time ready. Thank you Miguel A

## 2024-04-29 ENCOUNTER — PATIENT MESSAGE (OUTPATIENT)
Dept: FAMILY MEDICINE CLINIC | Age: 44
End: 2024-04-29

## 2024-04-29 RX ORDER — SEMAGLUTIDE 0.68 MG/ML
0.5 INJECTION, SOLUTION SUBCUTANEOUS WEEKLY
Qty: 3 ML | Refills: 0 | Status: SHIPPED | OUTPATIENT
Start: 2024-04-29

## 2024-04-29 NOTE — TELEPHONE ENCOUNTER
From: Miguel A Pepe  To: Dr. James Peace  Sent: 4/29/2024 12:49 AM EDT  Subject: Ozempic     I guess not going to be able to get the Mounjaro filled for awhile. I know pain in butt, I guess could go back to ozempic? If possible. Thank you Miguel A …. Will schedule a follow up for next coming month see how everything is going. If can send in a prescription for ozempic.

## 2024-05-23 ENCOUNTER — OFFICE VISIT (OUTPATIENT)
Dept: FAMILY MEDICINE CLINIC | Age: 44
End: 2024-05-23
Payer: COMMERCIAL

## 2024-05-23 VITALS
SYSTOLIC BLOOD PRESSURE: 114 MMHG | HEIGHT: 69 IN | HEART RATE: 91 BPM | BODY MASS INDEX: 38.51 KG/M2 | DIASTOLIC BLOOD PRESSURE: 70 MMHG | OXYGEN SATURATION: 98 % | WEIGHT: 260 LBS

## 2024-05-23 DIAGNOSIS — E11.9 TYPE 2 DIABETES MELLITUS WITHOUT COMPLICATION, WITHOUT LONG-TERM CURRENT USE OF INSULIN (HCC): Primary | ICD-10-CM

## 2024-05-23 LAB
HBA1C MFR BLD: 9.1 %
HBA1C MFR BLD: 9.1 %

## 2024-05-23 PROCEDURE — 3046F HEMOGLOBIN A1C LEVEL >9.0%: CPT | Performed by: FAMILY MEDICINE

## 2024-05-23 PROCEDURE — 1036F TOBACCO NON-USER: CPT | Performed by: FAMILY MEDICINE

## 2024-05-23 PROCEDURE — G8427 DOCREV CUR MEDS BY ELIG CLIN: HCPCS | Performed by: FAMILY MEDICINE

## 2024-05-23 PROCEDURE — 99214 OFFICE O/P EST MOD 30 MIN: CPT | Performed by: FAMILY MEDICINE

## 2024-05-23 PROCEDURE — G8417 CALC BMI ABV UP PARAM F/U: HCPCS | Performed by: FAMILY MEDICINE

## 2024-05-23 PROCEDURE — 2022F DILAT RTA XM EVC RTNOPTHY: CPT | Performed by: FAMILY MEDICINE

## 2024-05-23 PROCEDURE — 83036 HEMOGLOBIN GLYCOSYLATED A1C: CPT | Performed by: FAMILY MEDICINE

## 2024-05-23 RX ORDER — TIRZEPATIDE 7.5 MG/.5ML
7.5 INJECTION, SOLUTION SUBCUTANEOUS WEEKLY
Qty: 2 ML | Refills: 3 | Status: SHIPPED | OUTPATIENT
Start: 2024-05-23

## 2024-05-23 ASSESSMENT — PATIENT HEALTH QUESTIONNAIRE - PHQ9
SUM OF ALL RESPONSES TO PHQ QUESTIONS 1-9: 4
3. TROUBLE FALLING OR STAYING ASLEEP: NOT AT ALL
SUM OF ALL RESPONSES TO PHQ QUESTIONS 1-9: 4
2. FEELING DOWN, DEPRESSED OR HOPELESS: SEVERAL DAYS
SUM OF ALL RESPONSES TO PHQ9 QUESTIONS 1 & 2: 4
SUM OF ALL RESPONSES TO PHQ QUESTIONS 1-9: 4
7. TROUBLE CONCENTRATING ON THINGS, SUCH AS READING THE NEWSPAPER OR WATCHING TELEVISION: NOT AT ALL
6. FEELING BAD ABOUT YOURSELF - OR THAT YOU ARE A FAILURE OR HAVE LET YOURSELF OR YOUR FAMILY DOWN: NOT AT ALL
5. POOR APPETITE OR OVEREATING: NOT AT ALL
4. FEELING TIRED OR HAVING LITTLE ENERGY: NOT AT ALL
8. MOVING OR SPEAKING SO SLOWLY THAT OTHER PEOPLE COULD HAVE NOTICED. OR THE OPPOSITE, BEING SO FIGETY OR RESTLESS THAT YOU HAVE BEEN MOVING AROUND A LOT MORE THAN USUAL: NOT AT ALL
1. LITTLE INTEREST OR PLEASURE IN DOING THINGS: NEARLY EVERY DAY
9. THOUGHTS THAT YOU WOULD BE BETTER OFF DEAD, OR OF HURTING YOURSELF: NOT AT ALL
SUM OF ALL RESPONSES TO PHQ QUESTIONS 1-9: 4

## 2024-05-30 DIAGNOSIS — M54.2 NECK PAIN: ICD-10-CM

## 2024-05-30 RX ORDER — IBUPROFEN 800 MG/1
TABLET ORAL
Qty: 60 TABLET | Refills: 0 | Status: SHIPPED | OUTPATIENT
Start: 2024-05-30

## 2024-05-30 NOTE — TELEPHONE ENCOUNTER
Last Office Visit  -  5/23/24  Next Office Visit  -  n/a    Last Filled  -  4/15/24  Last UDS -    Contract -

## 2024-06-12 ENCOUNTER — OFFICE VISIT (OUTPATIENT)
Dept: PSYCHOLOGY | Age: 44
End: 2024-06-12
Payer: COMMERCIAL

## 2024-06-12 DIAGNOSIS — F41.9 ANXIETY DISORDER, UNSPECIFIED TYPE: Primary | ICD-10-CM

## 2024-06-12 DIAGNOSIS — R41.840 CONCENTRATION DEFICIT: ICD-10-CM

## 2024-06-12 PROCEDURE — 1036F TOBACCO NON-USER: CPT | Performed by: PSYCHOLOGIST

## 2024-06-12 PROCEDURE — 90791 PSYCH DIAGNOSTIC EVALUATION: CPT | Performed by: PSYCHOLOGIST

## 2024-06-12 ASSESSMENT — PATIENT HEALTH QUESTIONNAIRE - PHQ9
9. THOUGHTS THAT YOU WOULD BE BETTER OFF DEAD, OR OF HURTING YOURSELF: SEVERAL DAYS
1. LITTLE INTEREST OR PLEASURE IN DOING THINGS: MORE THAN HALF THE DAYS
2. FEELING DOWN, DEPRESSED OR HOPELESS: NEARLY EVERY DAY
10. IF YOU CHECKED OFF ANY PROBLEMS, HOW DIFFICULT HAVE THESE PROBLEMS MADE IT FOR YOU TO DO YOUR WORK, TAKE CARE OF THINGS AT HOME, OR GET ALONG WITH OTHER PEOPLE: SOMEWHAT DIFFICULT
SUM OF ALL RESPONSES TO PHQ9 QUESTIONS 1 & 2: 5
3. TROUBLE FALLING OR STAYING ASLEEP: MORE THAN HALF THE DAYS
7. TROUBLE CONCENTRATING ON THINGS, SUCH AS READING THE NEWSPAPER OR WATCHING TELEVISION: NEARLY EVERY DAY
SUM OF ALL RESPONSES TO PHQ QUESTIONS 1-9: 19
5. POOR APPETITE OR OVEREATING: SEVERAL DAYS
4. FEELING TIRED OR HAVING LITTLE ENERGY: MORE THAN HALF THE DAYS
SUM OF ALL RESPONSES TO PHQ QUESTIONS 1-9: 19
SUM OF ALL RESPONSES TO PHQ QUESTIONS 1-9: 18
SUM OF ALL RESPONSES TO PHQ QUESTIONS 1-9: 19
6. FEELING BAD ABOUT YOURSELF - OR THAT YOU ARE A FAILURE OR HAVE LET YOURSELF OR YOUR FAMILY DOWN: MORE THAN HALF THE DAYS
8. MOVING OR SPEAKING SO SLOWLY THAT OTHER PEOPLE COULD HAVE NOTICED. OR THE OPPOSITE, BEING SO FIGETY OR RESTLESS THAT YOU HAVE BEEN MOVING AROUND A LOT MORE THAN USUAL: NEARLY EVERY DAY

## 2024-06-12 ASSESSMENT — ANXIETY QUESTIONNAIRES
4. TROUBLE RELAXING: 3-NEARLY EVERY DAY
7. FEELING AFRAID AS IF SOMETHING AWFUL MIGHT HAPPEN: 1-SEVERAL DAYS
GAD7 TOTAL SCORE: 14
6. BECOMING EASILY ANNOYED OR IRRITABLE: 1-SEVERAL DAYS
1. FEELING NERVOUS, ANXIOUS, OR ON EDGE: SEVERAL DAYS
5. BEING SO RESTLESS THAT IT IS HARD TO SIT STILL: 3-NEARLY EVERY DAY
3. WORRYING TOO MUCH ABOUT DIFFERENT THINGS: 3-NEARLY EVERY DAY
2. NOT BEING ABLE TO STOP OR CONTROL WORRYING: 2-OVER HALF THE DAYS

## 2024-06-12 NOTE — PATIENT INSTRUCTIONS
Please complete the questionnaire about yourself and ask 1-2 people close to you to complete it about you as well.

## 2024-06-12 NOTE — PROGRESS NOTES
excessively throughout the night. She also grinds her teeth and has vivid dreams. Social support is good. Patient enjoys the following hobbies: podcasts, exercise, reading. Family history is positive for alcoholism (father and paternal relatives). Patient has not been in therapy in the past. She was labeled as \"slow\" by her school and placed in remedial classes.      O:  MSE:    Appearance: good hygiene   Attitude: cooperative and friendly  Consciousness: alert  Orientation: oriented to person, place, time, general circumstance  Memory: recent and remote memory intact  Attention/Concentration: intact during session  Psychomotor Activity:normal  Eye Contact: normal  Speech: normal rate and volume, well-articulated  Mood: anxious  Affect: anxious  Perception: within normal limits  Thought Content: all-or-none thinking  Thought Process: logical, coherent and goal-directed  Insight: good  Judgment: intact  Ability to understand instructions: Yes  Ability to respond meaningfully: Yes  Morbid Ideation: passive thoughts of death  Suicide Assessment: no suicidal ideation, plan, or intent  Homicidal Ideation: no    History:    Medications:   Current Outpatient Medications   Medication Sig Dispense Refill    ibuprofen (ADVIL;MOTRIN) 800 MG tablet TAKE 1 TABLET BY MOUTH TWICE DAILY AS NEEDED FOR PAIN 60 tablet 0    Tirzepatide (MOUNJARO) 7.5 MG/0.5ML SOPN SC injection Inject 0.5 mLs into the skin once a week 2 mL 3    rOPINIRole (REQUIP) 0.25 MG tablet TAKE 1 TABLET BY MOUTH THREE TIMES DAILY 90 tablet 3    cyclobenzaprine (FLEXERIL) 10 MG tablet TAKE 1 TABLET BY MOUTH THREE TIMES DAILY AS NEEDED FOR MUSCLE SPASMS 30 tablet 1    venlafaxine (EFFEXOR XR) 75 MG extended release capsule TAKE 1 CAPSULE BY MOUTH DAILY 30 capsule 5    spironolactone (ALDACTONE) 25 MG tablet TAKE 1 TABLET BY MOUTH DAILY 30 tablet 5    rosuvastatin (CRESTOR) 5 MG tablet rosuvastatin 5 mg tablet   Take 1 tablet every day by oral route around the clock

## 2024-06-13 ENCOUNTER — TELEPHONE (OUTPATIENT)
Dept: PSYCHOLOGY | Age: 44
End: 2024-06-13

## 2024-06-13 DIAGNOSIS — G47.9 SLEEP DISTURBANCE: Primary | ICD-10-CM

## 2024-06-13 NOTE — TELEPHONE ENCOUNTER
Patient describes excessive movement in her sleep, snoring, and talking in her sleep. Would it be appropriate to refer for a sleep study?

## 2024-06-24 ENCOUNTER — OFFICE VISIT (OUTPATIENT)
Dept: PSYCHOLOGY | Age: 44
End: 2024-06-24
Payer: COMMERCIAL

## 2024-06-24 DIAGNOSIS — F41.9 ANXIETY DISORDER, UNSPECIFIED TYPE: Primary | ICD-10-CM

## 2024-06-24 DIAGNOSIS — G47.9 SLEEP DISTURBANCE: ICD-10-CM

## 2024-06-24 PROCEDURE — 90832 PSYTX W PT 30 MINUTES: CPT | Performed by: PSYCHOLOGIST

## 2024-06-24 PROCEDURE — 1036F TOBACCO NON-USER: CPT | Performed by: PSYCHOLOGIST

## 2024-06-24 ASSESSMENT — PATIENT HEALTH QUESTIONNAIRE - PHQ9
8. MOVING OR SPEAKING SO SLOWLY THAT OTHER PEOPLE COULD HAVE NOTICED. OR THE OPPOSITE, BEING SO FIGETY OR RESTLESS THAT YOU HAVE BEEN MOVING AROUND A LOT MORE THAN USUAL: MORE THAN HALF THE DAYS
1. LITTLE INTEREST OR PLEASURE IN DOING THINGS: MORE THAN HALF THE DAYS
SUM OF ALL RESPONSES TO PHQ QUESTIONS 1-9: 16
SUM OF ALL RESPONSES TO PHQ QUESTIONS 1-9: 16
7. TROUBLE CONCENTRATING ON THINGS, SUCH AS READING THE NEWSPAPER OR WATCHING TELEVISION: NEARLY EVERY DAY
3. TROUBLE FALLING OR STAYING ASLEEP: MORE THAN HALF THE DAYS
9. THOUGHTS THAT YOU WOULD BE BETTER OFF DEAD, OR OF HURTING YOURSELF: NOT AT ALL
6. FEELING BAD ABOUT YOURSELF - OR THAT YOU ARE A FAILURE OR HAVE LET YOURSELF OR YOUR FAMILY DOWN: MORE THAN HALF THE DAYS
4. FEELING TIRED OR HAVING LITTLE ENERGY: MORE THAN HALF THE DAYS
10. IF YOU CHECKED OFF ANY PROBLEMS, HOW DIFFICULT HAVE THESE PROBLEMS MADE IT FOR YOU TO DO YOUR WORK, TAKE CARE OF THINGS AT HOME, OR GET ALONG WITH OTHER PEOPLE: SOMEWHAT DIFFICULT
2. FEELING DOWN, DEPRESSED OR HOPELESS: MORE THAN HALF THE DAYS
SUM OF ALL RESPONSES TO PHQ QUESTIONS 1-9: 16
SUM OF ALL RESPONSES TO PHQ QUESTIONS 1-9: 16
5. POOR APPETITE OR OVEREATING: SEVERAL DAYS
SUM OF ALL RESPONSES TO PHQ9 QUESTIONS 1 & 2: 4

## 2024-06-24 ASSESSMENT — ANXIETY QUESTIONNAIRES
GAD7 TOTAL SCORE: 11
2. NOT BEING ABLE TO STOP OR CONTROL WORRYING: 2-OVER HALF THE DAYS
1. FEELING NERVOUS, ANXIOUS, OR ON EDGE: SEVERAL DAYS
5. BEING SO RESTLESS THAT IT IS HARD TO SIT STILL: 2-OVER HALF THE DAYS
4. TROUBLE RELAXING: 2-OVER HALF THE DAYS
3. WORRYING TOO MUCH ABOUT DIFFERENT THINGS: 2-OVER HALF THE DAYS
6. BECOMING EASILY ANNOYED OR IRRITABLE: 1-SEVERAL DAYS
7. FEELING AFRAID AS IF SOMETHING AWFUL MIGHT HAPPEN: 1-SEVERAL DAYS

## 2024-06-24 NOTE — PROGRESS NOTES
Behavioral Health Consultation  Sun Camilo, Ph.D.  Psychologist  6/24/2024  11:33 AM EDT      Time spent with Patient: 25 minutes  This is patient's second  [facility] appointment.    Reason for Consult:    Chief Complaint   Patient presents with    Anxiety    ADHD     Referring Provider: OManasa, James HARTMAN MD  [address] [place] [address]    Feedback given to PCP.    S:  Patient reported that she had a stroke and seizure about 6 years ago. Realized that her decreased concentration may have gotten worse since then. [facility] reviewed notes from that admission. Discussed r/o sleep apnea, mood d/o, post-seizure/stroke sx. Offered referrals for neuropsych or psychological testing, but patient declined due to anticipated cost.     Reviewed academic history. Patient does not remember much from her childhood. Liked school, but was fidgety in when sitting in class. No disciplinary issues. Denies trouble concentrating, remembers being able to read books for English class with no difficulty. Completed her homework independently before her parents got home from work. Kept up with housework, got herself ready for school independently. She was able to transition to living on her own successfully. Denies forgetting to pay bills, any issues with ADLs. Admits to spending money impulsively, especially online. Often buys clothes that she does not wear. Does have some debt. Has had 2 speeding tickets, but none in 15 years. Suspects that her older sister has ADHD, but that sister has been dx with bipolar disorder. She suspects that her mother's sister and her father's sister were also diagnosed with bipolar disorder.         Describes herself as an anxious kid. She couldn't order at restaurants or drive-thrus until she was in her 30s. Did have some insomnia, especially in her 20s. Described discreet periods of time with decreased need for sleeping in her 20s. Also increased goal directed activity at that time. Still has monthly phases

## 2024-06-24 NOTE — PATIENT INSTRUCTIONS
Return to see Dr. Camilo the future, if interested. Wait until after your sleep study is completed to schedule this appointment. Feel free to reach out with any questions or for information about testing.

## 2024-06-26 ENCOUNTER — TELEPHONE (OUTPATIENT)
Dept: PSYCHOLOGY | Age: 44
End: 2024-06-26

## 2024-06-28 ENCOUNTER — COMMUNITY OUTREACH (OUTPATIENT)
Dept: FAMILY MEDICINE CLINIC | Age: 44
End: 2024-06-28

## 2024-07-08 DIAGNOSIS — M54.2 NECK PAIN: ICD-10-CM

## 2024-07-08 DIAGNOSIS — A60.9 HSV (HERPES SIMPLEX VIRUS) ANOGENITAL INFECTION: ICD-10-CM

## 2024-07-08 RX ORDER — IBUPROFEN 800 MG/1
TABLET ORAL
Qty: 60 TABLET | Refills: 0 | Status: SHIPPED | OUTPATIENT
Start: 2024-07-08

## 2024-07-08 RX ORDER — IBUPROFEN 800 MG/1
TABLET ORAL
Qty: 60 TABLET | Refills: 0 | Status: SHIPPED | OUTPATIENT
Start: 2024-07-08 | End: 2024-07-08 | Stop reason: SDUPTHER

## 2024-07-08 NOTE — TELEPHONE ENCOUNTER
Last Office Visit  -  5/23/24  Next Office Visit  -  7/29/24    Last Filled  -    Last UDS -    Contract -

## 2024-07-09 RX ORDER — ACYCLOVIR 800 MG/1
800 TABLET ORAL DAILY
Qty: 30 TABLET | Refills: 11 | Status: SHIPPED | OUTPATIENT
Start: 2024-07-09

## 2024-07-24 ENCOUNTER — PATIENT MESSAGE (OUTPATIENT)
Dept: FAMILY MEDICINE CLINIC | Age: 44
End: 2024-07-24

## 2024-07-24 NOTE — TELEPHONE ENCOUNTER
From: Miguel A Pepe  To: Dr. James Peace  Sent: 7/24/2024 3:20 PM EDT  Subject: Charlie     Was wondering if should go up in dose . Been on the 7.5 for awhile now if should go up to next dose. Thank you Miguel A

## 2024-10-10 ENCOUNTER — OFFICE VISIT (OUTPATIENT)
Dept: OBGYN CLINIC | Age: 44
End: 2024-10-10

## 2024-10-10 VITALS
DIASTOLIC BLOOD PRESSURE: 80 MMHG | OXYGEN SATURATION: 99 % | SYSTOLIC BLOOD PRESSURE: 128 MMHG | BODY MASS INDEX: 36.06 KG/M2 | HEART RATE: 89 BPM | WEIGHT: 244.2 LBS

## 2024-10-10 DIAGNOSIS — Z20.2 POSSIBLE EXPOSURE TO STD: ICD-10-CM

## 2024-10-10 DIAGNOSIS — Z01.419 WOMEN'S ANNUAL ROUTINE GYNECOLOGICAL EXAMINATION: Primary | ICD-10-CM

## 2024-10-10 DIAGNOSIS — E66.01 CLASS 3 SEVERE OBESITY DUE TO EXCESS CALORIES WITHOUT SERIOUS COMORBIDITY WITH BODY MASS INDEX (BMI) OF 40.0 TO 44.9 IN ADULT: ICD-10-CM

## 2024-10-10 DIAGNOSIS — Z12.31 BREAST CANCER SCREENING BY MAMMOGRAM: ICD-10-CM

## 2024-10-10 DIAGNOSIS — Z12.39 SCREENING BREAST EXAMINATION: ICD-10-CM

## 2024-10-10 DIAGNOSIS — E66.813 CLASS 3 SEVERE OBESITY DUE TO EXCESS CALORIES WITHOUT SERIOUS COMORBIDITY WITH BODY MASS INDEX (BMI) OF 40.0 TO 44.9 IN ADULT: ICD-10-CM

## 2024-10-10 DIAGNOSIS — Z86.19 HX OF HERPES GENITALIS: ICD-10-CM

## 2024-10-10 DIAGNOSIS — Z86.718 HX OF CEREBRAL VENOUS SINUS THROMBOSIS: ICD-10-CM

## 2024-10-10 DIAGNOSIS — E11.9 TYPE 2 DIABETES MELLITUS WITHOUT COMPLICATION, WITHOUT LONG-TERM CURRENT USE OF INSULIN (HCC): ICD-10-CM

## 2024-10-10 DIAGNOSIS — D68.51 HETEROZYGOUS FACTOR V LEIDEN MUTATION (HCC): ICD-10-CM

## 2024-10-10 DIAGNOSIS — Z12.4 PAP SMEAR FOR CERVICAL CANCER SCREENING: ICD-10-CM

## 2024-10-10 DIAGNOSIS — Z97.5 IUD (INTRAUTERINE DEVICE) IN PLACE: ICD-10-CM

## 2024-10-10 ASSESSMENT — ENCOUNTER SYMPTOMS
VOMITING: 0
BACK PAIN: 0
DIARRHEA: 0
ABDOMINAL PAIN: 0
SORE THROAT: 0
WHEEZING: 0
CHEST TIGHTNESS: 0
SHORTNESS OF BREATH: 0
NAUSEA: 0
ROS SKIN COMMENTS: ABNORMAL HAIR GROWTH
COLOR CHANGE: 0

## 2024-10-10 NOTE — PROGRESS NOTES
[facility] Ob/Gyn  Annual Exam    CC:   Chief Complaint   Patient presents with    Annual Exam       HPI: Miguel A Pepe is a 44 y.o. female who presents for annual exam.   Pt is doing well today without complaints. No changes to medical history. Other medical problems at baseline.    Pt has had a Hysteroscopy D and C on 12/19/19 due to endometrial polyps.  Has had a hx of cerebral venous thrombosis due to Diego use. She is heterozygous for factor V leiden.      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    Review of Systems:   Review of Systems   Constitutional:  Negative for appetite change, chills and fever.   HENT:  Negative for congestion, sneezing and sore throat.    Eyes:  Negative for visual disturbance.   Respiratory:  Negative for chest tightness, shortness of breath and wheezing.    Cardiovascular:  Negative for chest pain, palpitations and leg swelling.   Gastrointestinal:  Negative for abdominal pain, diarrhea, nausea and vomiting.   Endocrine: Negative for heat intolerance.   Genitourinary:  Negative for difficulty urinating, dyspareunia, dysuria, menstrual problem (doing well with IUD), pelvic pain, vaginal bleeding and vaginal discharge.   Musculoskeletal:  Negative for arthralgias, back pain, neck pain and neck stiffness.   Skin:  Negative for color change, pallor and rash.        Abnormal hair growth   Allergic/Immunologic: Negative for environmental allergies,

## 2024-10-11 LAB
C TRACH DNA CVX QL NAA+PROBE: NEGATIVE
N GONORRHOEA DNA CERV MUCUS QL NAA+PROBE: NEGATIVE

## 2024-10-15 LAB
HPV HR 12 DNA SPEC QL NAA+PROBE: NOT DETECTED
HPV16 DNA SPEC QL NAA+PROBE: NOT DETECTED
HPV16+18+H RISK 12 DNA SPEC-IMP: NORMAL
HPV18 DNA SPEC QL NAA+PROBE: NOT DETECTED

## 2024-10-17 DIAGNOSIS — G44.209 TENSION HEADACHE: ICD-10-CM

## 2024-10-17 DIAGNOSIS — M54.2 NECK PAIN: ICD-10-CM

## 2024-10-17 RX ORDER — CYCLOBENZAPRINE HCL 10 MG
TABLET ORAL
Qty: 30 TABLET | Refills: 1 | Status: SHIPPED | OUTPATIENT
Start: 2024-10-17

## 2024-10-17 RX ORDER — IBUPROFEN 800 MG/1
TABLET, FILM COATED ORAL
Qty: 60 TABLET | Refills: 0 | Status: SHIPPED | OUTPATIENT
Start: 2024-10-17

## 2024-10-17 NOTE — TELEPHONE ENCOUNTER
Last Office Visit  -  5/23/24  Next Office Visit  -  n/a    Last Filled  -    Last UDS -    Contract -

## 2024-10-23 DIAGNOSIS — G44.209 TENSION HEADACHE: ICD-10-CM

## 2024-10-23 RX ORDER — BUTALBITAL, ACETAMINOPHEN AND CAFFEINE 50; 325; 40 MG/1; MG/1; MG/1
1 TABLET ORAL EVERY 6 HOURS PRN
Qty: 20 TABLET | Refills: 0 | Status: SHIPPED | OUTPATIENT
Start: 2024-10-23

## 2024-10-23 NOTE — TELEPHONE ENCOUNTER
Last Office Visit  -  5/23/24  Next Office Visit  -  11/26/24    Last Filled  -  4/26/23  Last UDS -    Contract -

## 2024-11-26 ENCOUNTER — OFFICE VISIT (OUTPATIENT)
Dept: FAMILY MEDICINE CLINIC | Age: 44
End: 2024-11-26

## 2024-11-26 VITALS
OXYGEN SATURATION: 98 % | WEIGHT: 247 LBS | BODY MASS INDEX: 36.58 KG/M2 | SYSTOLIC BLOOD PRESSURE: 136 MMHG | HEIGHT: 69 IN | HEART RATE: 96 BPM | DIASTOLIC BLOOD PRESSURE: 80 MMHG

## 2024-11-26 DIAGNOSIS — Z23 NEED FOR PROPHYLACTIC VACCINATION AND INOCULATION AGAINST INFLUENZA: Primary | ICD-10-CM

## 2024-11-26 DIAGNOSIS — E11.9 TYPE 2 DIABETES MELLITUS WITHOUT COMPLICATION, WITHOUT LONG-TERM CURRENT USE OF INSULIN (HCC): ICD-10-CM

## 2024-11-26 DIAGNOSIS — Z12.31 ENCOUNTER FOR SCREENING MAMMOGRAM FOR MALIGNANT NEOPLASM OF BREAST: ICD-10-CM

## 2024-11-26 DIAGNOSIS — M54.2 NECK PAIN: ICD-10-CM

## 2024-11-26 LAB — HBA1C MFR BLD: 6.4 %

## 2024-11-26 RX ORDER — IBUPROFEN 800 MG/1
TABLET, FILM COATED ORAL
Qty: 60 TABLET | Refills: 0 | Status: SHIPPED | OUTPATIENT
Start: 2024-11-26

## 2024-11-26 RX ORDER — CLOBETASOL PROPIONATE 0.05 G/100ML
5 SHAMPOO TOPICAL EVERY OTHER DAY
Qty: 120 ML | Refills: 0 | Status: SHIPPED | OUTPATIENT
Start: 2024-11-26

## 2024-11-26 SDOH — ECONOMIC STABILITY: FOOD INSECURITY: WITHIN THE PAST 12 MONTHS, THE FOOD YOU BOUGHT JUST DIDN'T LAST AND YOU DIDN'T HAVE MONEY TO GET MORE.: NEVER TRUE

## 2024-11-26 SDOH — ECONOMIC STABILITY: INCOME INSECURITY: HOW HARD IS IT FOR YOU TO PAY FOR THE VERY BASICS LIKE FOOD, HOUSING, MEDICAL CARE, AND HEATING?: NOT VERY HARD

## 2024-11-26 SDOH — ECONOMIC STABILITY: FOOD INSECURITY: WITHIN THE PAST 12 MONTHS, YOU WORRIED THAT YOUR FOOD WOULD RUN OUT BEFORE YOU GOT MONEY TO BUY MORE.: NEVER TRUE

## 2024-12-07 NOTE — ASSESSMENT & PLAN NOTE
Chronic, at goal (stable), continue current treatment plan    Orders:    POCT glycosylated hemoglobin (Hb A1C)    Hemoglobin A1C   Date Value Ref Range Status   11/26/2024 6.4 % Final

## 2024-12-07 NOTE — PROGRESS NOTES
Miguel A Pepe (:  1980) is a 44 y.o. female,Established patient, here for evaluation of the following chief complaint(s):  Diabetes         Assessment & Plan  Need for prophylactic vaccination and inoculation against influenza       Orders:    Influenza, FLUCELVAX Trivalent, (age 6 mo+) IM, Preservative Free, 0.5mL    Encounter for screening mammogram for malignant neoplasm of breast         Orders:    ADRIÁN DIGITAL SCREEN W OR WO CAD BILATERAL; Future    Type 2 diabetes mellitus without complication, without long-term current use of insulin (HCC)   Chronic, at goal (stable), continue current treatment plan    Orders:    POCT glycosylated hemoglobin (Hb A1C)    Hemoglobin A1C   Date Value Ref Range Status   2024 6.4 % Final       Neck pain   Chronic, not at goal (unstable), continue current treatment plan    Orders:    ibuprofen (ADVIL;MOTRIN) 800 MG tablet; TAKE 1 TABLET BY MOUTH TWICE DAILY AS NEEDED FOR PAIN      No follow-ups on file.       Subjective   Miguel A Pepe is a 44 y.o. female. Patient presents with:  Diabetes      44-year-old female presents for follow-up of the following issues    1.  Type 2 diabetes mellitus.  Patient been taking her medication regularly.  Patient notes that this is working well and she has been following a good diet.  She is currently losing weight and doing well with this.  Patient feels overall well and having no significant polyuria polyphagia or polydipsia.    2.  Patient has chronic neck pain.  Patient notes that this is worsening with activity.  Denies any significant numbness or tingling into her upper extremities.  No weakness.  Patient notes it is worse when she is sitting typing at a computer.  Better with rest and laying down.  The patients PMH, surgical history, family history, medications, allergies were all reviewed and updated as appropriate today.      Diabetes        Review of Systems       Objective   Physical Exam  Vitals and nursing note

## 2024-12-16 RX ORDER — TIRZEPATIDE 12.5 MG/.5ML
INJECTION, SOLUTION SUBCUTANEOUS
Qty: 2 ML | Refills: 5 | Status: SHIPPED | OUTPATIENT
Start: 2024-12-16

## 2025-01-12 DIAGNOSIS — M54.2 NECK PAIN: ICD-10-CM

## 2025-01-13 RX ORDER — IBUPROFEN 800 MG/1
TABLET, FILM COATED ORAL
Qty: 60 TABLET | Refills: 0 | Status: SHIPPED | OUTPATIENT
Start: 2025-01-13

## 2025-02-13 ENCOUNTER — HOSPITAL ENCOUNTER (OUTPATIENT)
Dept: WOMENS IMAGING | Age: 45
Discharge: HOME | End: 2025-02-13
Payer: COMMERCIAL

## 2025-02-13 VITALS — HEIGHT: 69 IN | BODY MASS INDEX: 34.8 KG/M2 | WEIGHT: 235 LBS

## 2025-02-13 DIAGNOSIS — Z12.31 ENCOUNTER FOR SCREENING MAMMOGRAM FOR MALIGNANT NEOPLASM OF BREAST: ICD-10-CM

## 2025-02-13 PROCEDURE — 77063 BREAST TOMOSYNTHESIS BI: CPT

## 2025-02-15 DIAGNOSIS — F41.9 ANXIETY AND DEPRESSION: ICD-10-CM

## 2025-02-15 DIAGNOSIS — F32.A ANXIETY AND DEPRESSION: ICD-10-CM

## 2025-02-15 RX ORDER — VENLAFAXINE HYDROCHLORIDE 75 MG/1
75 CAPSULE, EXTENDED RELEASE ORAL DAILY
Qty: 30 CAPSULE | Refills: 5 | OUTPATIENT
Start: 2025-02-15

## 2025-02-15 RX ORDER — VENLAFAXINE HYDROCHLORIDE 75 MG/1
75 CAPSULE, EXTENDED RELEASE ORAL DAILY
Qty: 30 CAPSULE | Refills: 5 | Status: SHIPPED | OUTPATIENT
Start: 2025-02-15

## 2025-02-17 ENCOUNTER — TELEPHONE (OUTPATIENT)
Dept: WOMENS IMAGING | Age: 45
End: 2025-02-17

## 2025-02-17 DIAGNOSIS — R92.8 ABNORMALITY OF BOTH BREASTS ON SCREENING MAMMOGRAM: Primary | ICD-10-CM

## 2025-02-19 ENCOUNTER — TELEPHONE (OUTPATIENT)
Dept: WOMENS IMAGING | Age: 45
End: 2025-02-19

## 2025-02-19 ENCOUNTER — TELEPHONE (OUTPATIENT)
Dept: MAMMOGRAPHY | Age: 45
End: 2025-02-19

## 2025-02-19 NOTE — TELEPHONE ENCOUNTER
Reviewed screening mammogram results with patient.  Answered questions regarding results. Patient verbalized understanding. Patient has been scheduled for follow-up breast imaging appointment.    NIESHA Ward, CN-BM  Patient Navigator  [facility]  [phone]

## 2025-02-25 ENCOUNTER — HOSPITAL ENCOUNTER (OUTPATIENT)
Dept: WOMENS IMAGING | Age: 45
Discharge: HOME | End: 2025-02-25
Payer: COMMERCIAL

## 2025-02-25 DIAGNOSIS — R92.8 ABNORMALITY OF BOTH BREASTS ON SCREENING MAMMOGRAM: ICD-10-CM

## 2025-02-25 PROCEDURE — 76642 ULTRASOUND BREAST LIMITED: CPT

## 2025-02-25 PROCEDURE — G0279 TOMOSYNTHESIS, MAMMO: HCPCS

## 2025-03-28 DIAGNOSIS — M54.2 NECK PAIN: ICD-10-CM

## 2025-03-28 RX ORDER — IBUPROFEN 800 MG/1
800 TABLET, FILM COATED ORAL 2 TIMES DAILY PRN
Qty: 60 TABLET | Refills: 0 | Status: SHIPPED | OUTPATIENT
Start: 2025-03-28

## 2025-03-28 NOTE — TELEPHONE ENCOUNTER
Last Office Visit  -  11/26/24  Next Office Visit  -  n/a    Last Filled  -  1/13/25  Last UDS -    Contract -

## 2025-04-07 ENCOUNTER — PATIENT MESSAGE (OUTPATIENT)
Dept: FAMILY MEDICINE CLINIC | Age: 45
End: 2025-04-07

## 2025-04-07 DIAGNOSIS — E11.9 TYPE 2 DIABETES MELLITUS WITHOUT COMPLICATION, WITHOUT LONG-TERM CURRENT USE OF INSULIN: Primary | ICD-10-CM

## 2025-04-24 ENCOUNTER — OFFICE VISIT (OUTPATIENT)
Dept: FAMILY MEDICINE CLINIC | Age: 45
End: 2025-04-24
Payer: COMMERCIAL

## 2025-04-24 VITALS
DIASTOLIC BLOOD PRESSURE: 70 MMHG | WEIGHT: 236 LBS | OXYGEN SATURATION: 98 % | HEIGHT: 69 IN | HEART RATE: 77 BPM | SYSTOLIC BLOOD PRESSURE: 112 MMHG | BODY MASS INDEX: 34.96 KG/M2

## 2025-04-24 DIAGNOSIS — L29.9 GENERALIZED PRURITUS: ICD-10-CM

## 2025-04-24 DIAGNOSIS — E11.9 TYPE 2 DIABETES MELLITUS WITHOUT COMPLICATION, WITHOUT LONG-TERM CURRENT USE OF INSULIN (HCC): Primary | ICD-10-CM

## 2025-04-24 DIAGNOSIS — E11.9 TYPE 2 DIABETES MELLITUS WITHOUT COMPLICATION, WITHOUT LONG-TERM CURRENT USE OF INSULIN (HCC): ICD-10-CM

## 2025-04-24 LAB
ALBUMIN SERPL-MCNC: 4 G/DL (ref 3.4–5)
ALBUMIN/GLOB SERPL: 1.6 {RATIO} (ref 1.1–2.2)
ALP SERPL-CCNC: 59 U/L (ref 40–129)
ALT SERPL-CCNC: 15 U/L (ref 10–40)
ANION GAP SERPL CALCULATED.3IONS-SCNC: 9 MMOL/L (ref 3–16)
AST SERPL-CCNC: 15 U/L (ref 15–37)
BILIRUB SERPL-MCNC: 0.4 MG/DL (ref 0–1)
BUN SERPL-MCNC: 23 MG/DL (ref 7–20)
CALCIUM SERPL-MCNC: 9.9 MG/DL (ref 8.3–10.6)
CHLORIDE SERPL-SCNC: 98 MMOL/L (ref 99–110)
CHOLEST SERPL-MCNC: 158 MG/DL (ref 0–199)
CO2 SERPL-SCNC: 28 MMOL/L (ref 21–32)
CREAT SERPL-MCNC: 0.8 MG/DL (ref 0.6–1.1)
CREAT UR-MCNC: 65.7 MG/DL (ref 28–259)
GFR SERPLBLD CREATININE-BSD FMLA CKD-EPI: >90 ML/MIN/{1.73_M2}
GLUCOSE SERPL-MCNC: 112 MG/DL (ref 70–99)
HBA1C MFR BLD: 5.7 %
HDLC SERPL-MCNC: 43 MG/DL (ref 40–60)
LDLC SERPL CALC-MCNC: 98 MG/DL
MICROALBUMIN UR DL<=1MG/L-MCNC: <1.2 MG/DL
MICROALBUMIN/CREAT UR: NORMAL MG/G (ref 0–30)
POTASSIUM SERPL-SCNC: 4.1 MMOL/L (ref 3.5–5.1)
PROT SERPL-MCNC: 6.5 G/DL (ref 6.4–8.2)
SODIUM SERPL-SCNC: 135 MMOL/L (ref 136–145)
TRIGL SERPL-MCNC: 86 MG/DL (ref 0–150)
VLDLC SERPL CALC-MCNC: 17 MG/DL

## 2025-04-24 PROCEDURE — G8417 CALC BMI ABV UP PARAM F/U: HCPCS | Performed by: FAMILY MEDICINE

## 2025-04-24 PROCEDURE — 99214 OFFICE O/P EST MOD 30 MIN: CPT | Performed by: FAMILY MEDICINE

## 2025-04-24 PROCEDURE — 1036F TOBACCO NON-USER: CPT | Performed by: FAMILY MEDICINE

## 2025-04-24 PROCEDURE — G8427 DOCREV CUR MEDS BY ELIG CLIN: HCPCS | Performed by: FAMILY MEDICINE

## 2025-04-24 PROCEDURE — 83036 HEMOGLOBIN GLYCOSYLATED A1C: CPT | Performed by: FAMILY MEDICINE

## 2025-04-24 PROCEDURE — 2022F DILAT RTA XM EVC RTNOPTHY: CPT | Performed by: FAMILY MEDICINE

## 2025-04-24 PROCEDURE — 3044F HG A1C LEVEL LT 7.0%: CPT | Performed by: FAMILY MEDICINE

## 2025-04-24 RX ORDER — HYDROXYZINE HYDROCHLORIDE 25 MG/1
25 TABLET, FILM COATED ORAL EVERY 8 HOURS PRN
Qty: 30 TABLET | Refills: 0 | Status: SHIPPED | OUTPATIENT
Start: 2025-04-24 | End: 2025-05-04

## 2025-04-24 RX ORDER — TRIAMCINOLONE ACETONIDE 1 MG/G
CREAM TOPICAL
Qty: 453.6 G | Refills: 0 | Status: SHIPPED | OUTPATIENT
Start: 2025-04-24

## 2025-04-24 RX ORDER — METHYLPREDNISOLONE 4 MG/1
TABLET ORAL
Qty: 1 KIT | Refills: 0 | Status: SHIPPED | OUTPATIENT
Start: 2025-04-24

## 2025-04-24 SDOH — ECONOMIC STABILITY: FOOD INSECURITY: WITHIN THE PAST 12 MONTHS, YOU WORRIED THAT YOUR FOOD WOULD RUN OUT BEFORE YOU GOT MONEY TO BUY MORE.: NEVER TRUE

## 2025-04-24 SDOH — ECONOMIC STABILITY: FOOD INSECURITY: WITHIN THE PAST 12 MONTHS, THE FOOD YOU BOUGHT JUST DIDN'T LAST AND YOU DIDN'T HAVE MONEY TO GET MORE.: NEVER TRUE

## 2025-04-24 ASSESSMENT — PATIENT HEALTH QUESTIONNAIRE - PHQ9
6. FEELING BAD ABOUT YOURSELF - OR THAT YOU ARE A FAILURE OR HAVE LET YOURSELF OR YOUR FAMILY DOWN: SEVERAL DAYS
9. THOUGHTS THAT YOU WOULD BE BETTER OFF DEAD, OR OF HURTING YOURSELF: SEVERAL DAYS
8. MOVING OR SPEAKING SO SLOWLY THAT OTHER PEOPLE COULD HAVE NOTICED. OR THE OPPOSITE, BEING SO FIGETY OR RESTLESS THAT YOU HAVE BEEN MOVING AROUND A LOT MORE THAN USUAL: MORE THAN HALF THE DAYS
7. TROUBLE CONCENTRATING ON THINGS, SUCH AS READING THE NEWSPAPER OR WATCHING TELEVISION: NEARLY EVERY DAY
1. LITTLE INTEREST OR PLEASURE IN DOING THINGS: SEVERAL DAYS
SUM OF ALL RESPONSES TO PHQ QUESTIONS 1-9: 12
SUM OF ALL RESPONSES TO PHQ QUESTIONS 1-9: 12
5. POOR APPETITE OR OVEREATING: SEVERAL DAYS
10. IF YOU CHECKED OFF ANY PROBLEMS, HOW DIFFICULT HAVE THESE PROBLEMS MADE IT FOR YOU TO DO YOUR WORK, TAKE CARE OF THINGS AT HOME, OR GET ALONG WITH OTHER PEOPLE: SOMEWHAT DIFFICULT
3. TROUBLE FALLING OR STAYING ASLEEP: SEVERAL DAYS
SUM OF ALL RESPONSES TO PHQ QUESTIONS 1-9: 11
2. FEELING DOWN, DEPRESSED OR HOPELESS: SEVERAL DAYS
4. FEELING TIRED OR HAVING LITTLE ENERGY: SEVERAL DAYS
SUM OF ALL RESPONSES TO PHQ QUESTIONS 1-9: 12

## 2025-04-24 ASSESSMENT — COLUMBIA-SUICIDE SEVERITY RATING SCALE - C-SSRS
2. HAVE YOU ACTUALLY HAD ANY THOUGHTS OF KILLING YOURSELF?: NO
6. HAVE YOU EVER DONE ANYTHING, STARTED TO DO ANYTHING, OR PREPARED TO DO ANYTHING TO END YOUR LIFE?: NO
1. WITHIN THE PAST MONTH, HAVE YOU WISHED YOU WERE DEAD OR WISHED YOU COULD GO TO SLEEP AND NOT WAKE UP?: NO

## 2025-04-24 NOTE — ASSESSMENT & PLAN NOTE
Chronic, at goal (stable), continue current treatment plan    Orders:    POCT glycosylated hemoglobin (Hb A1C)    LIPID PANEL; Future    Albumin/Creatinine Ratio, Urine; Future    Comprehensive Metabolic Panel; Future

## 2025-04-24 NOTE — PROGRESS NOTES
Miguel A Pepe (:  1980) is a 44 y.o. female,Established patient, here for evaluation of the following chief complaint(s):  Skin Problem (Itching )         Assessment & Plan  Type 2 diabetes mellitus without complication, without long-term current use of insulin (HCC)   Chronic, at goal (stable), continue current treatment plan    Orders:    POCT glycosylated hemoglobin (Hb A1C)    LIPID PANEL; Future    Albumin/Creatinine Ratio, Urine; Future    Comprehensive Metabolic Panel; Future    Generalized pruritus   New, uncertain prognosis, changes made today:      Orders:    methylPREDNISolone (MEDROL, GRECIA,) 4 MG tablet; Take by mouth as directed    triamcinolone (KENALOG) 0.1 % cream; Apply topically 2 times daily.    hydrOXYzine HCl (ATARAX) 25 MG tablet; Take 1 tablet by mouth every 8 hours as needed for Itching      Assessment & Plan  1. Generalized pruritus.  - The etiology of the pruritus is likely due to an environmental exposure, which may have been transient or could be attributed to airborne allergens.  - Physical examination reveals multiple scratch marks and papules, with itching reported all over the body, including hands, legs, and ears.  - The condition is not associated with her diabetes, and the possibility of scabies has been ruled out based on the distribution of the rash.  - Prescribed an oral steroid and a potent topical steroid cream for application on the affected areas, excluding the neck, once or twice daily. Hydroxyzine will be provided as an anti-itch medication. Advised to continue swimming activities as the salt water and chlorine may potentially aid in alleviating the rash.    2. Diabetes mellitus.  - Her diabetes is well-controlled with an A1c of 5.7, improved from 6.4 at her last visit.  - The use of steroids for her pruritus may elevate her blood sugar levels, and this has been discussed with her.  - Recent A1c results confirm that diabetes is not the cause of her

## 2025-04-25 ENCOUNTER — RESULTS FOLLOW-UP (OUTPATIENT)
Dept: FAMILY MEDICINE CLINIC | Age: 45
End: 2025-04-25

## 2025-05-11 DIAGNOSIS — L29.9 GENERALIZED PRURITUS: ICD-10-CM

## 2025-05-12 RX ORDER — HYDROXYZINE HYDROCHLORIDE 25 MG/1
25 TABLET, FILM COATED ORAL EVERY 8 HOURS PRN
Qty: 30 TABLET | Refills: 0 | Status: SHIPPED | OUTPATIENT
Start: 2025-05-12 | End: 2025-05-22

## 2025-06-09 DIAGNOSIS — M54.2 NECK PAIN: ICD-10-CM

## 2025-06-09 RX ORDER — IBUPROFEN 800 MG/1
800 TABLET, FILM COATED ORAL 2 TIMES DAILY PRN
Qty: 60 TABLET | Refills: 0 | Status: SHIPPED | OUTPATIENT
Start: 2025-06-09

## 2025-07-14 RX ORDER — CLOBETASOL PROPIONATE 0.05 G/100ML
SHAMPOO TOPICAL
Qty: 118 ML | Refills: 0 | Status: SHIPPED | OUTPATIENT
Start: 2025-07-14

## 2025-07-30 DIAGNOSIS — M54.2 NECK PAIN: ICD-10-CM

## 2025-07-30 RX ORDER — IBUPROFEN 800 MG/1
800 TABLET, FILM COATED ORAL 2 TIMES DAILY PRN
Qty: 60 TABLET | Refills: 0 | Status: SHIPPED | OUTPATIENT
Start: 2025-07-30

## 2025-07-30 NOTE — TELEPHONE ENCOUNTER
Last Office Visit  -  04/24/2025  Next Office Visit  -  n/a    Last Filled  -    Last UDS -    Contract -

## 2025-08-31 DIAGNOSIS — M54.2 NECK PAIN: ICD-10-CM

## 2025-09-01 RX ORDER — IBUPROFEN 800 MG/1
800 TABLET, FILM COATED ORAL 2 TIMES DAILY PRN
Qty: 60 TABLET | Refills: 0 | Status: SHIPPED | OUTPATIENT
Start: 2025-09-01

## (undated) DEVICE — LIGHT HANDLE: Brand: DEVON

## (undated) DEVICE — TELFA NON-ADHERENT ABSORBENT DRESSING: Brand: TELFA

## (undated) DEVICE — SET FLD MGMT OUTFLO TB DISP FOR CTRL SYS AQUILEX

## (undated) DEVICE — SET ADMIN PRIMING 7ML L30IN 7.35LB 20 GTT 2ND RLER CLMP

## (undated) DEVICE — TRAY PREP DRY W/ PREM GLV 2 APPL 6 SPNG 2 UNDPD 1 OVERWRAP

## (undated) DEVICE — TOWEL,OR,DSP,ST,BLUE,STD,4/PK,20PK/CS: Brand: MEDLINE

## (undated) DEVICE — SOLUTION IRRIG 5L LAC R BG

## (undated) DEVICE — SET GRAV VENT NVENT CK VLV 3 NDL FREE PRT 10 GTT

## (undated) DEVICE — METER ACCU-CHEK INFORM BASE GLUCOSE

## (undated) DEVICE — DRAPE,UNDERBUTTOCKS,PCH,STERILE: Brand: MEDLINE

## (undated) DEVICE — CATHETER IV 20GA L1.25IN PNK FEP SFTY STR HUB RADPQ DISP

## (undated) DEVICE — PACK LITH I INCL DRP W ATTCH LEG BK TBL CVR ABSRB TWL CUST

## (undated) DEVICE — EXTENSION SET, 2 INJECTION SITES, MALE LUER LOCK ADAPTER WITH RETRACTABLE COLLAR: Brand: INTERLINK

## (undated) DEVICE — 3M™ TEGADERM™ TRANSPARENT FILM DRESSING FRAME STYLE, 1624W, 2-3/8 IN X 2-3/4 IN (6 CM X 7 CM), 100/CT 4CT/CASE: Brand: 3M™ TEGADERM™

## (undated) DEVICE — SOLUTION IV 1000ML LAC RINGERS PH 6.5 INJ USP VIAFLX PLAS

## (undated) DEVICE — SET FLD MGMT CTRL SYS INFLO TB AQUILEX

## (undated) DEVICE — X-RAY DETECTABLE SPONGES,16 PLY: Brand: VISTEC

## (undated) DEVICE — DEVICE TISS REM IU CANSTR VAC TB FT PEDAL DISPOSABLE MYOSURE

## (undated) DEVICE — GLOVE SURG SZ 65 L12IN FNGR THK94MIL STD WHT LTX FREE

## (undated) DEVICE — SMARTGOWN SURGICAL GOWN, LARGE: Brand: CONVERTORS

## (undated) DEVICE — KENDALL SCD EXPRESS SLEEVES, KNEE LENGTH, LARGE: Brand: KENDALL SCD

## (undated) DEVICE — SHEET,DRAPE,53X77,STERILE: Brand: MEDLINE

## (undated) DEVICE — SET ENDOSCP SEAL HYSTEROSCOPE RIG OUTFLO CHN DISP MYOSURE

## (undated) DEVICE — PVC URETHRAL CATHETER: Brand: DOVER